# Patient Record
Sex: MALE | Race: BLACK OR AFRICAN AMERICAN | NOT HISPANIC OR LATINO | Employment: OTHER | URBAN - METROPOLITAN AREA
[De-identification: names, ages, dates, MRNs, and addresses within clinical notes are randomized per-mention and may not be internally consistent; named-entity substitution may affect disease eponyms.]

---

## 2022-05-31 ENCOUNTER — HOSPITAL ENCOUNTER (EMERGENCY)
Facility: HOSPITAL | Age: 57
Discharge: HOME | End: 2022-05-31
Attending: EMERGENCY MEDICINE
Payer: MEDICAID

## 2022-05-31 VITALS
RESPIRATION RATE: 16 BRPM | HEART RATE: 85 BPM | WEIGHT: 180 LBS | BODY MASS INDEX: 26.66 KG/M2 | DIASTOLIC BLOOD PRESSURE: 95 MMHG | TEMPERATURE: 99 F | OXYGEN SATURATION: 99 % | HEIGHT: 69 IN | SYSTOLIC BLOOD PRESSURE: 130 MMHG

## 2022-05-31 DIAGNOSIS — M43.6 TORTICOLLIS, ACUTE: Primary | ICD-10-CM

## 2022-05-31 PROCEDURE — 25000003 PHARM REV CODE 250: Performed by: PHYSICIAN ASSISTANT

## 2022-05-31 PROCEDURE — 99284 EMERGENCY DEPT VISIT MOD MDM: CPT

## 2022-05-31 RX ORDER — MELOXICAM 7.5 MG/1
7.5 TABLET ORAL DAILY
Qty: 12 TABLET | Refills: 0 | Status: SHIPPED | OUTPATIENT
Start: 2022-05-31 | End: 2023-04-07 | Stop reason: CLARIF

## 2022-05-31 RX ORDER — ACETAMINOPHEN 500 MG
1000 TABLET ORAL
Status: COMPLETED | OUTPATIENT
Start: 2022-05-31 | End: 2022-05-31

## 2022-05-31 RX ORDER — ORPHENADRINE CITRATE 100 MG/1
100 TABLET, EXTENDED RELEASE ORAL 2 TIMES DAILY
Qty: 8 TABLET | Refills: 0 | Status: SHIPPED | OUTPATIENT
Start: 2022-05-31 | End: 2022-06-04

## 2022-05-31 RX ADMIN — ACETAMINOPHEN 1000 MG: 500 TABLET ORAL at 03:05

## 2022-05-31 NOTE — Clinical Note
"Homil "Homil" Jimy was seen and treated in our emergency department on 5/31/2022.  He may return to work on 06/03/2022.       If you have any questions or concerns, please don't hesitate to call.      Jack Otoole PA-C"
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
## 2022-05-31 NOTE — DISCHARGE INSTRUCTIONS

## 2022-09-15 ENCOUNTER — HOSPITAL ENCOUNTER (EMERGENCY)
Facility: HOSPITAL | Age: 57
Discharge: HOME | End: 2022-09-15
Attending: EMERGENCY MEDICINE
Payer: MEDICAID

## 2022-09-15 VITALS
RESPIRATION RATE: 17 BRPM | TEMPERATURE: 99 F | HEART RATE: 94 BPM | WEIGHT: 180 LBS | BODY MASS INDEX: 26.66 KG/M2 | DIASTOLIC BLOOD PRESSURE: 82 MMHG | SYSTOLIC BLOOD PRESSURE: 135 MMHG | HEIGHT: 69 IN | OXYGEN SATURATION: 100 %

## 2022-09-15 DIAGNOSIS — M10.9 ACUTE GOUT OF LEFT KNEE, UNSPECIFIED CAUSE: Primary | ICD-10-CM

## 2022-09-15 DIAGNOSIS — R52 PAIN: ICD-10-CM

## 2022-09-15 LAB
ALBUMIN SERPL BCP-MCNC: 4.2 G/DL (ref 3.5–5.2)
ALP SERPL-CCNC: 38 U/L (ref 55–135)
ALT SERPL W/O P-5'-P-CCNC: 16 U/L (ref 10–44)
ANION GAP SERPL CALC-SCNC: 13 MMOL/L (ref 8–16)
AST SERPL-CCNC: 27 U/L (ref 10–40)
BASOPHILS # BLD AUTO: 0.04 K/UL (ref 0–0.2)
BASOPHILS NFR BLD: 0.7 % (ref 0–1.9)
BILIRUB SERPL-MCNC: 0.5 MG/DL (ref 0.1–1)
BUN SERPL-MCNC: 30 MG/DL (ref 6–20)
CALCIUM SERPL-MCNC: 10 MG/DL (ref 8.7–10.5)
CHLORIDE SERPL-SCNC: 102 MMOL/L (ref 95–110)
CO2 SERPL-SCNC: 24 MMOL/L (ref 23–29)
CREAT SERPL-MCNC: 1.3 MG/DL (ref 0.5–1.4)
CRP SERPL-MCNC: 6.6 MG/L (ref 0–8.2)
DIFFERENTIAL METHOD: ABNORMAL
EOSINOPHIL # BLD AUTO: 0.1 K/UL (ref 0–0.5)
EOSINOPHIL NFR BLD: 2.6 % (ref 0–8)
ERYTHROCYTE [DISTWIDTH] IN BLOOD BY AUTOMATED COUNT: 13 % (ref 11.5–14.5)
ERYTHROCYTE [SEDIMENTATION RATE] IN BLOOD BY WESTERGREN METHOD: 4 MM/HR (ref 0–10)
EST. GFR  (NO RACE VARIABLE): >60 ML/MIN/1.73 M^2
GLUCOSE SERPL-MCNC: 105 MG/DL (ref 70–110)
HCT VFR BLD AUTO: 39.5 % (ref 40–54)
HGB BLD-MCNC: 13.4 G/DL (ref 14–18)
IMM GRANULOCYTES # BLD AUTO: 0.06 K/UL (ref 0–0.04)
IMM GRANULOCYTES NFR BLD AUTO: 1.1 % (ref 0–0.5)
LYMPHOCYTES # BLD AUTO: 1.4 K/UL (ref 1–4.8)
LYMPHOCYTES NFR BLD: 25.1 % (ref 18–48)
MCH RBC QN AUTO: 32.9 PG (ref 27–31)
MCHC RBC AUTO-ENTMCNC: 33.9 G/DL (ref 32–36)
MCV RBC AUTO: 97 FL (ref 82–98)
MONOCYTES # BLD AUTO: 0.8 K/UL (ref 0.3–1)
MONOCYTES NFR BLD: 13.8 % (ref 4–15)
NEUTROPHILS # BLD AUTO: 3.1 K/UL (ref 1.8–7.7)
NEUTROPHILS NFR BLD: 56.7 % (ref 38–73)
NRBC BLD-RTO: 0 /100 WBC
PLATELET # BLD AUTO: 263 K/UL (ref 150–450)
PMV BLD AUTO: 8.4 FL (ref 9.2–12.9)
POTASSIUM SERPL-SCNC: 4.5 MMOL/L (ref 3.5–5.1)
PROT SERPL-MCNC: 8.1 G/DL (ref 6–8.4)
RBC # BLD AUTO: 4.07 M/UL (ref 4.6–6.2)
SODIUM SERPL-SCNC: 139 MMOL/L (ref 136–145)
URATE SERPL-MCNC: 7.5 MG/DL (ref 3.4–7)
WBC # BLD AUTO: 5.45 K/UL (ref 3.9–12.7)

## 2022-09-15 PROCEDURE — 96374 THER/PROPH/DIAG INJ IV PUSH: CPT

## 2022-09-15 PROCEDURE — 86140 C-REACTIVE PROTEIN: CPT

## 2022-09-15 PROCEDURE — 80053 COMPREHEN METABOLIC PANEL: CPT

## 2022-09-15 PROCEDURE — 85025 COMPLETE CBC W/AUTO DIFF WBC: CPT

## 2022-09-15 PROCEDURE — 63600175 PHARM REV CODE 636 W HCPCS

## 2022-09-15 PROCEDURE — 85652 RBC SED RATE AUTOMATED: CPT

## 2022-09-15 PROCEDURE — 25000003 PHARM REV CODE 250

## 2022-09-15 PROCEDURE — 84550 ASSAY OF BLOOD/URIC ACID: CPT

## 2022-09-15 PROCEDURE — 99284 EMERGENCY DEPT VISIT MOD MDM: CPT | Mod: 25

## 2022-09-15 RX ORDER — KETOROLAC TROMETHAMINE 30 MG/ML
15 INJECTION, SOLUTION INTRAMUSCULAR; INTRAVENOUS
Status: COMPLETED | OUTPATIENT
Start: 2022-09-15 | End: 2022-09-15

## 2022-09-15 RX ORDER — INDOMETHACIN 50 MG/1
50 CAPSULE ORAL
Qty: 15 CAPSULE | Refills: 0 | Status: SHIPPED | OUTPATIENT
Start: 2022-09-15 | End: 2023-04-07 | Stop reason: CLARIF

## 2022-09-15 RX ORDER — COLCHICINE 0.6 MG/1
1.2 TABLET, FILM COATED ORAL
Status: COMPLETED | OUTPATIENT
Start: 2022-09-15 | End: 2022-09-15

## 2022-09-15 RX ORDER — SIMVASTATIN 40 MG/1
40 TABLET, FILM COATED ORAL NIGHTLY
COMMUNITY
Start: 2022-07-28 | End: 2023-04-07 | Stop reason: CLARIF

## 2022-09-15 RX ORDER — COLCHICINE 0.6 MG/1
0.6 TABLET ORAL DAILY
Qty: 1 TABLET | Refills: 0 | Status: SHIPPED | OUTPATIENT
Start: 2022-09-15 | End: 2023-04-07 | Stop reason: CLARIF

## 2022-09-15 RX ORDER — LOSARTAN POTASSIUM AND HYDROCHLOROTHIAZIDE 25; 100 MG/1; MG/1
1 TABLET ORAL DAILY
Status: ON HOLD | COMMUNITY
Start: 2022-07-28 | End: 2023-04-09 | Stop reason: HOSPADM

## 2022-09-15 RX ADMIN — KETOROLAC TROMETHAMINE 15 MG: 30 INJECTION, SOLUTION INTRAMUSCULAR; INTRAVENOUS at 08:09

## 2022-09-15 RX ADMIN — COLCHICINE 1.2 MG: 0.6 TABLET, FILM COATED ORAL at 09:09

## 2022-09-15 NOTE — ED PROVIDER NOTES
Encounter Date: 9/15/2022    SCRIBE #1 NOTE: ICuauhtemoc, am scribing for, and in the presence of,  Alayna Holdsworth, PA-C. Other sections scribed: HPI, ROS.     History     Chief Complaint   Patient presents with    Knee Pain     Pt presents to ED c/o left knee pain and left knee swelling x 2 days.  Pt also reports left knee is tender to touch and warm.  Denies numbness, tingling, falls, injury or any other symptoms.   Pt reports taking Tylenol last night with some relief.  Pain 6/10.      CC: Knee pain    HPI: This is a  56 y.o. M who has HTN who presents to the ED for emergent evaluation of acute and intermittent left knee pain that began 3 days ago. Pt rates the knee pain a 6/10. He describes the knee pain as a burning sensation. The pt's knee pain is exacerbated with walking. He took Tylenol with minimal relief. He does not have a Hx of similar flare up. He reports a Hx of a cyst that was removed from the posterior aspect of the left knee in 2012. He does not have a Hx of Gout. Pt has HTN and High cholesterol, and is compliant with medications. There is no known ill contact. Pt reports no recent travel out of the country. He denies any recent trauma or falls. He has no concerns for STD's. Pt denies fever, chills, diaphoresis, SOB, CP, abdominal pain, nausea, vomiting, diarrhea, constipation, difficulty urinating, dysuria, urinary frequency, urine decreased, penile discharge, penile pain, penile swelling, rash, headaches, numbness, tingling, alcohol use, or recreational drug use.        The history is provided by the patient. No  was used.   Review of patient's allergies indicates:  No Known Allergies  Past Medical History:   Diagnosis Date    Hypertension      Past Surgical History:   Procedure Laterality Date    LEG SURGERY       Family History   Problem Relation Age of Onset    Vision loss Father      Social History     Tobacco Use    Smoking status: Every Day    Smokeless tobacco:  Never   Substance Use Topics    Alcohol use: Yes    Drug use: No     Review of Systems   Constitutional:  Negative for chills, diaphoresis and fever.   HENT:  Negative for sore throat.    Respiratory:  Negative for shortness of breath.    Cardiovascular:  Negative for chest pain.   Gastrointestinal:  Negative for abdominal pain, constipation, diarrhea, nausea and vomiting.   Genitourinary:  Negative for decreased urine volume, difficulty urinating, dysuria, frequency, penile discharge, penile pain, penile swelling, scrotal swelling, testicular pain and urgency.   Musculoskeletal:  Positive for arthralgias (left knee) and joint swelling (left knee). Negative for back pain.   Skin:  Negative for rash and wound.   Neurological:  Negative for weakness, numbness and headaches.        (-) Tingling   Hematological:  Does not bruise/bleed easily.     Physical Exam     Initial Vitals [09/15/22 0653]   BP Pulse Resp Temp SpO2   (!) 140/86 96 18 98.5 °F (36.9 °C) 100 %      MAP       --         Physical Exam    Nursing note and vitals reviewed.  Constitutional: He appears well-developed and well-nourished. He is not diaphoretic. He is active. He does not appear ill. No distress.   HENT:   Head: Normocephalic and atraumatic.   Right Ear: External ear normal.   Left Ear: External ear normal.   Nose: Nose normal.   Eyes: Conjunctivae and lids are normal. Pupils are equal, round, and reactive to light. Right eye exhibits no discharge. Left eye exhibits no discharge. No scleral icterus.   Neck: Neck supple.   Normal range of motion.  Cardiovascular:  Normal rate and regular rhythm.           Pulmonary/Chest: Effort normal and breath sounds normal. No respiratory distress.   Abdominal: He exhibits no distension.   Musculoskeletal:         General: Normal range of motion.      Cervical back: Normal range of motion and neck supple.      Right upper leg: Normal.      Left upper leg: Normal.      Right knee: Normal.      Left knee:  Swelling present. No deformity, effusion, erythema or lacerations. Normal range of motion. Tenderness present over the medial joint line.      Right lower leg: Normal.      Left lower leg: Normal.        Legs:       Comments: TTP of left medial knee with swelling and warmth appreciated. Painful ROM with flexion and extension. Normal sensation.      Neurological: He is alert and oriented to person, place, and time.   Skin: Skin is dry. Capillary refill takes less than 2 seconds.       ED Course   Procedures  Labs Reviewed   CBC W/ AUTO DIFFERENTIAL - Abnormal; Notable for the following components:       Result Value    RBC 4.07 (*)     Hemoglobin 13.4 (*)     Hematocrit 39.5 (*)     MCH 32.9 (*)     MPV 8.4 (*)     Immature Granulocytes 1.1 (*)     Immature Grans (Abs) 0.06 (*)     All other components within normal limits   COMPREHENSIVE METABOLIC PANEL - Abnormal; Notable for the following components:    BUN 30 (*)     Alkaline Phosphatase 38 (*)     All other components within normal limits   URIC ACID - Abnormal; Notable for the following components:    Uric Acid 7.5 (*)     All other components within normal limits   SEDIMENTATION RATE   C-REACTIVE PROTEIN          Imaging Results              X-Ray Knee 3 View Left (Final result)  Result time 09/15/22 07:35:25      Final result by Justin Maguire MD (09/15/22 07:35:25)                   Impression:      As above.      Electronically signed by: Justin Maguire MD  Date:    09/15/2022  Time:    07:35               Narrative:    EXAMINATION:  XR KNEE 3 VIEW LEFT    CLINICAL HISTORY:  Pain, unspecified    TECHNIQUE:  AP, lateral, and Merchant views of the left knee were performed.    FINDINGS:  There is medial compartment joint space loss.  There is no fracture, dislocation, or bony erosion.                                       Medications   ketorolac injection 15 mg (15 mg Intravenous Given 9/15/22 0818)   colchicine capsule/tablet 1.2 mg (1.2 mg Oral Given 9/15/22  2207)     Medical Decision Making:   Initial Assessment:   56 y.o. M who has HTN who presents to the ED for emergent evaluation of acute and intermittent left knee pain.  Patient's chart and medical history reviewed.  Differential Diagnosis:   Fracture  Dislocation  Gout  Pseudogout  Septic arthritis  OA  Clinical Tests:   Lab Tests: Ordered and Reviewed  Radiological Study: Ordered and Reviewed  ED Management:  Patient's vitals reviewed.  He is afebrile, no respiratory distress, nontoxic-appearing in the ED. Patient had TTP of left medial knee with swelling and warmth appreciated. Painful ROM with flexion and extension. Normal sensation.  Patient given Toradol for pain. X-ray showed There is medial compartment joint space loss.  There is no fracture, dislocation, or bony erosion.  CBC showed anemia with an H&H of 13.4 and 39.5 respectively, no need for transfusion at this time. CRP and sed rate were normal range, unlikely septic arthritis.  Uric acid was elevated, likely gout. CMP showed elevated BUN; most likely secondary to the gout.  Patient states he is feeling better.  Patient given colchicine in the ED.  Patient will be sent home with 1 more dose to take an hour later.  Patient be also sent indomethacin for symptomatic control.  Patient will follow-up with his PCP.  Discussed with strict diet precautions to avoid gout flare-ups in the future. Patient agrees with this plan. Discussed with him strict return precautions, he verbalized understanding. Patient is stable for discharge.           Scribe Attestation:   Scribe #1: I performed the above scribed service and the documentation accurately describes the services I performed. I attest to the accuracy of the note.                 I, Alayna Holdsworth,PA-C, personally performed the services described in this documentation. All medical record entries made by the scribe were at my direction and in my presence.  I have reviewed the chart and agree that the record  reflects my personal performance and is accurate and complete.  Clinical Impression:   Final diagnoses:  [R52] Pain  [M10.9] Acute gout of left knee, unspecified cause (Primary)        ED Disposition Condition    Discharge Stable          ED Prescriptions       Medication Sig Dispense Start Date End Date Auth. Provider    colchicine (COLCRYS) 0.6 mg tablet Take 1 tablet (0.6 mg total) by mouth once daily. for 1 day 1 tablet 9/15/2022 9/16/2022 Alayna Holdsworth, PA-C    indomethacin (INDOCIN) 50 MG capsule Take 1 capsule (50 mg total) by mouth 3 (three) times daily with meals. 15 capsule 9/15/2022 -- Alayna Holdsworth, PA-C          Follow-up Information       Follow up With Specialties Details Why Contact Info    [facility] [place]    [address]  [place] [address]  [phone]               Alayna Holdsworth, PA-C  09/15/22 3745

## 2022-09-15 NOTE — ED TRIAGE NOTES
Pt presents to ED c/o left knee pain ( 6/10, burning and achy) and left knee swelling x 3 days.  Pt also reports left knee is tender to touch and warm.    Pt denies numbness, tingling, falls, injury or any other symptoms.     Pt reports taking Tylenol last night with some relief.   Pt reports a surgery behind left knee surgery in 2005.   Pt AAOx4.

## 2022-09-15 NOTE — DISCHARGE INSTRUCTIONS
Thank you for coming to our Emergency Department today. It is important to remember that some problems are difficult to diagnose and may not be found during your first visit. Be sure to follow up with your primary care doctor and review any labs/imaging that was performed with them. If you do not have a primary care doctor, you may contact the one listed on your discharge paperwork or you may also call the [facility] Appointment Desk at [phone] to schedule an appointment with one.     All medications may potentially have side effects and it is impossible to predict which medications may give you side effects. If you feel that you are having a negative effect of any medication you should immediately stop taking them and seek medical attention.    Return to the ER with any questions/concerns, new/concerning symptoms, worsening or failure to improve. Do not drive or make any important decisions for 24 hours if you have received any pain medications, sedatives or mood altering drugs during your ER visit.

## 2023-04-07 ENCOUNTER — HOSPITAL ENCOUNTER (OUTPATIENT)
Facility: HOSPITAL | Age: 58
Discharge: HOME | DRG: 432 | End: 2023-04-09
Attending: INTERNAL MEDICINE | Admitting: STUDENT IN AN ORGANIZED HEALTH CARE EDUCATION/TRAINING PROGRAM
Payer: MEDICAID

## 2023-04-07 DIAGNOSIS — K92.2 UPPER GI BLEED: Primary | ICD-10-CM

## 2023-04-07 DIAGNOSIS — K70.10 ALCOHOLIC HEPATITIS WITHOUT ASCITES: ICD-10-CM

## 2023-04-07 DIAGNOSIS — E16.2 HYPOGLYCEMIA: ICD-10-CM

## 2023-04-07 DIAGNOSIS — F10.930 ALCOHOL WITHDRAWAL SYNDROME WITHOUT COMPLICATION: ICD-10-CM

## 2023-04-07 DIAGNOSIS — R07.9 CHEST PAIN: ICD-10-CM

## 2023-04-07 DIAGNOSIS — R00.0 TACHYCARDIA: ICD-10-CM

## 2023-04-07 DIAGNOSIS — R79.89 ELEVATED TROPONIN: ICD-10-CM

## 2023-04-07 DIAGNOSIS — K92.0 HEMATEMESIS WITH NAUSEA: ICD-10-CM

## 2023-04-07 DIAGNOSIS — F10.939 ALCOHOL WITHDRAWAL: ICD-10-CM

## 2023-04-07 DIAGNOSIS — D72.829 LEUKOCYTOSIS, UNSPECIFIED TYPE: ICD-10-CM

## 2023-04-07 LAB
ABO + RH BLD: NORMAL
ALBUMIN SERPL BCP-MCNC: 4 G/DL (ref 3.5–5.2)
ALP SERPL-CCNC: 49 U/L (ref 55–135)
ALT SERPL W/O P-5'-P-CCNC: 97 U/L (ref 10–44)
AMPHET+METHAMPHET UR QL: NEGATIVE
ANION GAP SERPL CALC-SCNC: 17 MMOL/L (ref 8–16)
ANION GAP SERPL CALC-SCNC: 35 MMOL/L (ref 8–16)
AST SERPL-CCNC: 163 U/L (ref 10–40)
BARBITURATES UR QL SCN>200 NG/ML: NEGATIVE
BASOPHILS # BLD AUTO: 0.06 K/UL (ref 0–0.2)
BASOPHILS NFR BLD: 0.4 % (ref 0–1.9)
BENZODIAZ UR QL SCN>200 NG/ML: NEGATIVE
BILIRUB SERPL-MCNC: 0.4 MG/DL (ref 0.1–1)
BILIRUB UR QL STRIP: NEGATIVE
BLD GP AB SCN CELLS X3 SERPL QL: NORMAL
BNP SERPL-MCNC: 36 PG/ML (ref 0–99)
BUN SERPL-MCNC: 23 MG/DL (ref 6–20)
BUN SERPL-MCNC: 24 MG/DL (ref 6–20)
BZE UR QL SCN: NEGATIVE
CALCIUM SERPL-MCNC: 8.7 MG/DL (ref 8.7–10.5)
CALCIUM SERPL-MCNC: 9.2 MG/DL (ref 8.7–10.5)
CANNABINOIDS UR QL SCN: NEGATIVE
CHLORIDE SERPL-SCNC: 102 MMOL/L (ref 95–110)
CHLORIDE SERPL-SCNC: 105 MMOL/L (ref 95–110)
CLARITY UR: CLEAR
CO2 SERPL-SCNC: 19 MMOL/L (ref 23–29)
CO2 SERPL-SCNC: 8 MMOL/L (ref 23–29)
COLOR UR: COLORLESS
CREAT SERPL-MCNC: 1.3 MG/DL (ref 0.5–1.4)
CREAT SERPL-MCNC: 1.4 MG/DL (ref 0.5–1.4)
CREAT UR-MCNC: 53.9 MG/DL (ref 23–375)
CTP QC/QA: YES
CTP QC/QA: YES
D DIMER PPP IA.FEU-MCNC: >33 MG/L FEU
DIFFERENTIAL METHOD: ABNORMAL
EOSINOPHIL # BLD AUTO: 0 K/UL (ref 0–0.5)
EOSINOPHIL NFR BLD: 0 % (ref 0–8)
ERYTHROCYTE [DISTWIDTH] IN BLOOD BY AUTOMATED COUNT: 17.6 % (ref 11.5–14.5)
EST. GFR  (NO RACE VARIABLE): 58 ML/MIN/1.73 M^2
EST. GFR  (NO RACE VARIABLE): >60 ML/MIN/1.73 M^2
ETHANOL SERPL-MCNC: <10 MG/DL
GLUCOSE SERPL-MCNC: 100 MG/DL (ref 70–110)
GLUCOSE SERPL-MCNC: 136 MG/DL (ref 70–110)
GLUCOSE UR QL STRIP: NEGATIVE
HCT VFR BLD AUTO: 42.4 % (ref 40–54)
HGB BLD-MCNC: 13.4 G/DL (ref 14–18)
HGB UR QL STRIP: ABNORMAL
IMM GRANULOCYTES # BLD AUTO: 0.1 K/UL (ref 0–0.04)
IMM GRANULOCYTES NFR BLD AUTO: 0.6 % (ref 0–0.5)
INR PPP: 1.3 (ref 0.8–1.2)
KETONES UR QL STRIP: ABNORMAL
LEUKOCYTE ESTERASE UR QL STRIP: NEGATIVE
LYMPHOCYTES # BLD AUTO: 0.8 K/UL (ref 1–4.8)
LYMPHOCYTES NFR BLD: 5.5 % (ref 18–48)
MAGNESIUM SERPL-MCNC: 1.4 MG/DL (ref 1.6–2.6)
MCH RBC QN AUTO: 32.8 PG (ref 27–31)
MCHC RBC AUTO-ENTMCNC: 31.6 G/DL (ref 32–36)
MCV RBC AUTO: 104 FL (ref 82–98)
METHADONE UR QL SCN>300 NG/ML: NEGATIVE
MICROSCOPIC COMMENT: NORMAL
MONOCYTES # BLD AUTO: 1.9 K/UL (ref 0.3–1)
MONOCYTES NFR BLD: 12.1 % (ref 4–15)
NEUTROPHILS # BLD AUTO: 12.6 K/UL (ref 1.8–7.7)
NEUTROPHILS NFR BLD: 81.4 % (ref 38–73)
NITRITE UR QL STRIP: NEGATIVE
NRBC BLD-RTO: 0 /100 WBC
OPIATES UR QL SCN: NEGATIVE
PCP UR QL SCN>25 NG/ML: NEGATIVE
PH UR STRIP: 5 [PH] (ref 5–8)
PLATELET # BLD AUTO: 236 K/UL (ref 150–450)
PMV BLD AUTO: 11.9 FL (ref 9.2–12.9)
POC MOLECULAR INFLUENZA A AGN: NEGATIVE
POC MOLECULAR INFLUENZA B AGN: NEGATIVE
POCT GLUCOSE: 106 MG/DL (ref 70–110)
POCT GLUCOSE: 210 MG/DL (ref 70–110)
POCT GLUCOSE: 96 MG/DL (ref 70–110)
POTASSIUM SERPL-SCNC: 5.2 MMOL/L (ref 3.5–5.1)
POTASSIUM SERPL-SCNC: 5.8 MMOL/L (ref 3.5–5.1)
PROT SERPL-MCNC: 7.8 G/DL (ref 6–8.4)
PROT UR QL STRIP: ABNORMAL
PROTHROMBIN TIME: 13.3 SEC (ref 9–12.5)
RBC # BLD AUTO: 4.09 M/UL (ref 4.6–6.2)
RBC #/AREA URNS HPF: 0 /HPF (ref 0–4)
SARS-COV-2 RDRP RESP QL NAA+PROBE: NEGATIVE
SODIUM SERPL-SCNC: 141 MMOL/L (ref 136–145)
SODIUM SERPL-SCNC: 145 MMOL/L (ref 136–145)
SP GR UR STRIP: 1.01 (ref 1–1.03)
SPECIMEN OUTDATE: NORMAL
TOXICOLOGY INFORMATION: NORMAL
TROPONIN I SERPL DL<=0.01 NG/ML-MCNC: 0.02 NG/ML (ref 0–0.03)
TROPONIN I SERPL DL<=0.01 NG/ML-MCNC: 0.03 NG/ML (ref 0–0.03)
TROPONIN I SERPL DL<=0.01 NG/ML-MCNC: 0.03 NG/ML (ref 0–0.03)
URN SPEC COLLECT METH UR: ABNORMAL
UROBILINOGEN UR STRIP-ACNC: NEGATIVE EU/DL
WBC # BLD AUTO: 15.41 K/UL (ref 3.9–12.7)

## 2023-04-07 PROCEDURE — 96372 THER/PROPH/DIAG INJ SC/IM: CPT | Mod: 59 | Performed by: INTERNAL MEDICINE

## 2023-04-07 PROCEDURE — 96366 THER/PROPH/DIAG IV INF ADDON: CPT

## 2023-04-07 PROCEDURE — 63600175 PHARM REV CODE 636 W HCPCS: Performed by: EMERGENCY MEDICINE

## 2023-04-07 PROCEDURE — 86900 BLOOD TYPING SEROLOGIC ABO: CPT | Performed by: EMERGENCY MEDICINE

## 2023-04-07 PROCEDURE — 25000003 PHARM REV CODE 250: Performed by: STUDENT IN AN ORGANIZED HEALTH CARE EDUCATION/TRAINING PROGRAM

## 2023-04-07 PROCEDURE — 96375 TX/PRO/DX INJ NEW DRUG ADDON: CPT | Mod: 59

## 2023-04-07 PROCEDURE — C9113 INJ PANTOPRAZOLE SODIUM, VIA: HCPCS | Performed by: INTERNAL MEDICINE

## 2023-04-07 PROCEDURE — 83735 ASSAY OF MAGNESIUM: CPT | Performed by: EMERGENCY MEDICINE

## 2023-04-07 PROCEDURE — 25000003 PHARM REV CODE 250: Performed by: INTERNAL MEDICINE

## 2023-04-07 PROCEDURE — 80053 COMPREHEN METABOLIC PANEL: CPT | Performed by: INTERNAL MEDICINE

## 2023-04-07 PROCEDURE — 80307 DRUG TEST PRSMV CHEM ANLYZR: CPT | Performed by: EMERGENCY MEDICINE

## 2023-04-07 PROCEDURE — G0378 HOSPITAL OBSERVATION PER HR: HCPCS

## 2023-04-07 PROCEDURE — 84484 ASSAY OF TROPONIN QUANT: CPT | Mod: 91 | Performed by: EMERGENCY MEDICINE

## 2023-04-07 PROCEDURE — 93005 ELECTROCARDIOGRAM TRACING: CPT

## 2023-04-07 PROCEDURE — 87502 INFLUENZA DNA AMP PROBE: CPT

## 2023-04-07 PROCEDURE — 86900 BLOOD TYPING SEROLOGIC ABO: CPT | Performed by: STUDENT IN AN ORGANIZED HEALTH CARE EDUCATION/TRAINING PROGRAM

## 2023-04-07 PROCEDURE — 82962 GLUCOSE BLOOD TEST: CPT | Mod: 59

## 2023-04-07 PROCEDURE — 25000003 PHARM REV CODE 250: Performed by: EMERGENCY MEDICINE

## 2023-04-07 PROCEDURE — 99223 PR INITIAL HOSPITAL CARE,LEVL III: ICD-10-PCS | Mod: ,,, | Performed by: INTERNAL MEDICINE

## 2023-04-07 PROCEDURE — 83880 ASSAY OF NATRIURETIC PEPTIDE: CPT | Performed by: INTERNAL MEDICINE

## 2023-04-07 PROCEDURE — 84484 ASSAY OF TROPONIN QUANT: CPT | Mod: 91 | Performed by: STUDENT IN AN ORGANIZED HEALTH CARE EDUCATION/TRAINING PROGRAM

## 2023-04-07 PROCEDURE — 85379 FIBRIN DEGRADATION QUANT: CPT | Performed by: EMERGENCY MEDICINE

## 2023-04-07 PROCEDURE — 82077 ASSAY SPEC XCP UR&BREATH IA: CPT | Performed by: EMERGENCY MEDICINE

## 2023-04-07 PROCEDURE — 63600175 PHARM REV CODE 636 W HCPCS: Mod: JA | Performed by: INTERNAL MEDICINE

## 2023-04-07 PROCEDURE — 80048 BASIC METABOLIC PNL TOTAL CA: CPT | Mod: XB | Performed by: EMERGENCY MEDICINE

## 2023-04-07 PROCEDURE — 99285 EMERGENCY DEPT VISIT HI MDM: CPT | Mod: 25

## 2023-04-07 PROCEDURE — 84484 ASSAY OF TROPONIN QUANT: CPT | Performed by: INTERNAL MEDICINE

## 2023-04-07 PROCEDURE — 99223 1ST HOSP IP/OBS HIGH 75: CPT | Mod: ,,, | Performed by: INTERNAL MEDICINE

## 2023-04-07 PROCEDURE — 63600175 PHARM REV CODE 636 W HCPCS: Performed by: HOSPITALIST

## 2023-04-07 PROCEDURE — 11000001 HC ACUTE MED/SURG PRIVATE ROOM

## 2023-04-07 PROCEDURE — 96361 HYDRATE IV INFUSION ADD-ON: CPT

## 2023-04-07 PROCEDURE — 96368 THER/DIAG CONCURRENT INF: CPT

## 2023-04-07 PROCEDURE — 25500020 PHARM REV CODE 255: Performed by: EMERGENCY MEDICINE

## 2023-04-07 PROCEDURE — 96365 THER/PROPH/DIAG IV INF INIT: CPT

## 2023-04-07 PROCEDURE — 81000 URINALYSIS NONAUTO W/SCOPE: CPT | Mod: 59 | Performed by: EMERGENCY MEDICINE

## 2023-04-07 PROCEDURE — 93010 ELECTROCARDIOGRAM REPORT: CPT | Mod: ,,, | Performed by: INTERNAL MEDICINE

## 2023-04-07 PROCEDURE — 85610 PROTHROMBIN TIME: CPT | Performed by: STUDENT IN AN ORGANIZED HEALTH CARE EDUCATION/TRAINING PROGRAM

## 2023-04-07 PROCEDURE — 63600175 PHARM REV CODE 636 W HCPCS: Mod: JZ,JG | Performed by: INTERNAL MEDICINE

## 2023-04-07 PROCEDURE — 93010 EKG 12-LEAD: ICD-10-PCS | Mod: ,,, | Performed by: INTERNAL MEDICINE

## 2023-04-07 PROCEDURE — 36415 COLL VENOUS BLD VENIPUNCTURE: CPT | Performed by: STUDENT IN AN ORGANIZED HEALTH CARE EDUCATION/TRAINING PROGRAM

## 2023-04-07 PROCEDURE — 85025 COMPLETE CBC W/AUTO DIFF WBC: CPT | Performed by: INTERNAL MEDICINE

## 2023-04-07 RX ORDER — ONDANSETRON 8 MG/1
8 TABLET, ORALLY DISINTEGRATING ORAL EVERY 8 HOURS PRN
Status: DISCONTINUED | OUTPATIENT
Start: 2023-04-07 | End: 2023-04-09 | Stop reason: HOSPADM

## 2023-04-07 RX ORDER — FOLIC ACID 1 MG/1
1 TABLET ORAL DAILY
Status: DISCONTINUED | OUTPATIENT
Start: 2023-04-07 | End: 2023-04-07

## 2023-04-07 RX ORDER — ATORVASTATIN CALCIUM 20 MG/1
20 TABLET, FILM COATED ORAL NIGHTLY
Status: ON HOLD | COMMUNITY
Start: 2023-02-07 | End: 2023-04-09 | Stop reason: HOSPADM

## 2023-04-07 RX ORDER — DEXTROSE 40 %
30 GEL (GRAM) ORAL
Status: DISCONTINUED | OUTPATIENT
Start: 2023-04-07 | End: 2023-04-09 | Stop reason: HOSPADM

## 2023-04-07 RX ORDER — ACETAMINOPHEN 500 MG
1000 TABLET ORAL
Status: COMPLETED | OUTPATIENT
Start: 2023-04-07 | End: 2023-04-07

## 2023-04-07 RX ORDER — LANOLIN ALCOHOL/MO/W.PET/CERES
100 CREAM (GRAM) TOPICAL DAILY
Status: DISCONTINUED | OUTPATIENT
Start: 2023-04-07 | End: 2023-04-07

## 2023-04-07 RX ORDER — PANTOPRAZOLE SODIUM 40 MG/10ML
40 INJECTION, POWDER, LYOPHILIZED, FOR SOLUTION INTRAVENOUS
Status: COMPLETED | OUTPATIENT
Start: 2023-04-07 | End: 2023-04-07

## 2023-04-07 RX ORDER — SODIUM CHLORIDE 0.9 % (FLUSH) 0.9 %
10 SYRINGE (ML) INJECTION EVERY 12 HOURS PRN
Status: DISCONTINUED | OUTPATIENT
Start: 2023-04-07 | End: 2023-04-09 | Stop reason: HOSPADM

## 2023-04-07 RX ORDER — LANOLIN ALCOHOL/MO/W.PET/CERES
100 CREAM (GRAM) TOPICAL DAILY
Status: DISCONTINUED | OUTPATIENT
Start: 2023-04-08 | End: 2023-04-09 | Stop reason: HOSPADM

## 2023-04-07 RX ORDER — ACETAMINOPHEN 325 MG/1
650 TABLET ORAL EVERY 4 HOURS PRN
Status: DISCONTINUED | OUTPATIENT
Start: 2023-04-07 | End: 2023-04-09 | Stop reason: HOSPADM

## 2023-04-07 RX ORDER — LORAZEPAM 2 MG/ML
1 INJECTION INTRAMUSCULAR
Status: COMPLETED | OUTPATIENT
Start: 2023-04-07 | End: 2023-04-07

## 2023-04-07 RX ORDER — GLUCAGON 1 MG
1 KIT INJECTION
Status: DISCONTINUED | OUTPATIENT
Start: 2023-04-07 | End: 2023-04-09 | Stop reason: HOSPADM

## 2023-04-07 RX ORDER — FOLIC ACID 1 MG/1
1 TABLET ORAL DAILY
Status: DISCONTINUED | OUTPATIENT
Start: 2023-04-08 | End: 2023-04-09 | Stop reason: HOSPADM

## 2023-04-07 RX ORDER — DIAZEPAM 5 MG/1
5 TABLET ORAL EVERY 8 HOURS
Status: DISCONTINUED | OUTPATIENT
Start: 2023-04-07 | End: 2023-04-09

## 2023-04-07 RX ORDER — FOLIC ACID 1 MG/1
1 TABLET ORAL
Status: COMPLETED | OUTPATIENT
Start: 2023-04-07 | End: 2023-04-07

## 2023-04-07 RX ORDER — DIPHENHYDRAMINE HYDROCHLORIDE 50 MG/ML
25 INJECTION INTRAMUSCULAR; INTRAVENOUS ONCE
Status: COMPLETED | OUTPATIENT
Start: 2023-04-07 | End: 2023-04-07

## 2023-04-07 RX ORDER — DEXTROSE 40 %
15 GEL (GRAM) ORAL
Status: DISCONTINUED | OUTPATIENT
Start: 2023-04-07 | End: 2023-04-09 | Stop reason: HOSPADM

## 2023-04-07 RX ORDER — NALOXONE HCL 0.4 MG/ML
0.02 VIAL (ML) INJECTION
Status: DISCONTINUED | OUTPATIENT
Start: 2023-04-07 | End: 2023-04-09 | Stop reason: HOSPADM

## 2023-04-07 RX ORDER — GLUCAGON 1 MG
1 KIT INJECTION
Status: COMPLETED | OUTPATIENT
Start: 2023-04-07 | End: 2023-04-07

## 2023-04-07 RX ORDER — PROCHLORPERAZINE EDISYLATE 5 MG/ML
5 INJECTION INTRAMUSCULAR; INTRAVENOUS ONCE
Status: COMPLETED | OUTPATIENT
Start: 2023-04-07 | End: 2023-04-07

## 2023-04-07 RX ORDER — AMLODIPINE BESYLATE 10 MG/1
10 TABLET ORAL DAILY
Status: ON HOLD | COMMUNITY
End: 2023-04-09 | Stop reason: HOSPADM

## 2023-04-07 RX ORDER — LORAZEPAM 0.5 MG/1
2 TABLET ORAL EVERY 4 HOURS PRN
Status: DISCONTINUED | OUTPATIENT
Start: 2023-04-07 | End: 2023-04-09 | Stop reason: HOSPADM

## 2023-04-07 RX ORDER — LANOLIN ALCOHOL/MO/W.PET/CERES
100 CREAM (GRAM) TOPICAL
Status: COMPLETED | OUTPATIENT
Start: 2023-04-07 | End: 2023-04-07

## 2023-04-07 RX ORDER — DIAZEPAM 5 MG/1
5 TABLET ORAL
Status: COMPLETED | OUTPATIENT
Start: 2023-04-07 | End: 2023-04-07

## 2023-04-07 RX ADMIN — OCTREOTIDE ACETATE 50 MCG/HR: 500 INJECTION, SOLUTION INTRAVENOUS; SUBCUTANEOUS at 09:04

## 2023-04-07 RX ADMIN — DIAZEPAM 5 MG: 5 TABLET ORAL at 03:04

## 2023-04-07 RX ADMIN — PANTOPRAZOLE SODIUM 8 MG/HR: 40 INJECTION, POWDER, FOR SOLUTION INTRAVENOUS at 10:04

## 2023-04-07 RX ADMIN — GLUCAGON 1 MG: 1 INJECTION, POWDER, LYOPHILIZED, FOR SOLUTION INTRAMUSCULAR; INTRAVENOUS at 04:04

## 2023-04-07 RX ADMIN — THIAMINE HCL TAB 100 MG 100 MG: 100 TAB at 03:04

## 2023-04-07 RX ADMIN — ONDANSETRON 8 MG: 8 TABLET, ORALLY DISINTEGRATING ORAL at 05:04

## 2023-04-07 RX ADMIN — DIPHENHYDRAMINE HYDROCHLORIDE 25 MG: 50 INJECTION, SOLUTION INTRAMUSCULAR; INTRAVENOUS at 08:04

## 2023-04-07 RX ADMIN — LORAZEPAM 1 MG: 2 INJECTION INTRAMUSCULAR; INTRAVENOUS at 12:04

## 2023-04-07 RX ADMIN — IOHEXOL 100 ML: 350 INJECTION, SOLUTION INTRAVENOUS at 12:04

## 2023-04-07 RX ADMIN — OCTREOTIDE ACETATE 50 MCG/HR: 500 INJECTION, SOLUTION INTRAVENOUS; SUBCUTANEOUS at 08:04

## 2023-04-07 RX ADMIN — PANTOPRAZOLE SODIUM 8 MG/HR: 40 INJECTION, POWDER, FOR SOLUTION INTRAVENOUS at 08:04

## 2023-04-07 RX ADMIN — FOLIC ACID 1 MG: 1 TABLET ORAL at 03:04

## 2023-04-07 RX ADMIN — PANTOPRAZOLE SODIUM 40 MG: 40 INJECTION, POWDER, FOR SOLUTION INTRAVENOUS at 06:04

## 2023-04-07 RX ADMIN — ACETAMINOPHEN 1000 MG: 500 TABLET ORAL at 06:04

## 2023-04-07 RX ADMIN — SODIUM CHLORIDE 2000 ML: 9 INJECTION, SOLUTION INTRAVENOUS at 05:04

## 2023-04-07 RX ADMIN — PANTOPRAZOLE SODIUM 8 MG/HR: 40 INJECTION, POWDER, FOR SOLUTION INTRAVENOUS at 05:04

## 2023-04-07 RX ADMIN — PROCHLORPERAZINE EDISYLATE 5 MG: 5 INJECTION INTRAMUSCULAR; INTRAVENOUS at 08:04

## 2023-04-07 NOTE — H&P
[facility] [place] Emergency Dept  [facility] Medicine  History & Physical    Patient Name: Idalia Ahn  MRN: [id number]  Patient Class: IP- Inpatient  Admission Date: 4/7/2023  Attending Physician: Jj Kaur MD   Primary Care Provider: Provider [employer]         Patient information was obtained from patient, past medical records and ER records.     Subjective:     Principal Problem:Alcoholic hepatitis without ascites    Chief Complaint:   Chief Complaint   Patient presents with    Hypoglycemia     Pt arrived via ems, pt chief complaint is Hypoglycemia. Pt initially called for flu like symptoms not feeling well. Pt was then found to have CBG of 38, pt received d10 bolus and last check was 68.         HPI: Mr. Ahn is a 58 yo M with hx of HLD and HTN who presents to the ED for evaluation of fatigue, weakness and sore throat. He states he drank 1-2 bottles/shots of gin yesterday and started to develop symptoms. Has had a good appetite but yesterday did not eat much.  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  He did not feel well who presented to the ER last night where he then proceeded to vomit.  He tells me this was mainly water with streaks of blood in it.   In the ED, he was found to have an elevated WBC, elevated d-dimer, metabolic acidosis. There was also concerns for GIB per ED. He was started on IV PPI and octreotide in ED. CTA with no definite PE. Admitted to hospital medicine for further management.      Past Medical History:   Diagnosis Date    Alcohol abuse, daily use     Hypertension        Past Surgical History:   Procedure Laterality Date    LEG SURGERY Left     POSTERIOR KNEE CYST REMOVAL       Review of patient's allergies indicates:  No Known Allergies    No current facility-administered medications on file prior to encounter.     Current Outpatient Medications on File Prior to Encounter   Medication Sig    amlodipine (NORVASC) 10 MG tablet Take 1 tablet (10 mg total) by  mouth once daily.    colchicine (COLCRYS) 0.6 mg tablet Take 1 tablet (0.6 mg total) by mouth once daily. for 1 day    indomethacin (INDOCIN) 50 MG capsule Take 1 capsule (50 mg total) by mouth 3 (three) times daily with meals.    losartan-hydrochlorothiazide 100-25 mg (HYZAAR) 100-25 mg per tablet Take 1 tablet by mouth once daily.    meloxicam (MOBIC) 7.5 MG tablet Take 1 tablet (7.5 mg total) by mouth once daily.    simvastatin (ZOCOR) 40 MG tablet Take 40 mg by mouth every evening.     Family History       Problem Relation (Age of Onset)    Vision loss Father          Tobacco Use    Smoking status: Some Days     Types: Cigarettes    Smokeless tobacco: Never   Substance and Sexual Activity    Alcohol use: Yes     Comment: DAILY LIQUOR/GIN USE    Drug use: No    Sexual activity: Not on file     Review of Systems  Objective:     Vital Signs (Most Recent):  Temp: 99 °F (37.2 °C) (04/07/23 1149)  Pulse: 90 (04/07/23 1607)  Resp: (!) 29 (04/07/23 1511)  BP: 135/83 (04/07/23 1602)  SpO2: 97 % (04/07/23 1607)   Vital Signs (24h Range):  Temp:  [98.5 °F (36.9 °C)-99 °F (37.2 °C)] 99 °F (37.2 °C)  Pulse:  [] 90  Resp:  [11-29] 29  SpO2:  [95 %-100 %] 97 %  BP: (110-151)/(60-90) 135/83     Weight: 81.6 kg (180 lb)  Body mass index is 26.58 kg/m².    Physical Exam  Vitals and nursing note reviewed.   Constitutional:       General: He is not in acute distress.     Appearance: Normal appearance. He is ill-appearing.   HENT:      Mouth/Throat:      Pharynx: Oropharynx is clear.   Cardiovascular:      Rate and Rhythm: Regular rhythm. Tachycardia present.      Pulses: Normal pulses.   Pulmonary:      Effort: Pulmonary effort is normal. No respiratory distress.      Breath sounds: No wheezing.   Abdominal:      General: Bowel sounds are normal. There is no distension.      Palpations: Abdomen is soft.   Musculoskeletal:         General: No swelling.   Skin:     General: Skin is warm and dry.   Neurological:       General: No focal deficit present.      Mental Status: He is alert and oriented to person, place, and time.   Psychiatric:         Mood and Affect: Mood normal.      Comments: jittery           Significant Labs: All pertinent labs within the past 24 hours have been reviewed.    Significant Imaging: I have reviewed all pertinent imaging results/findings within the past 24 hours.    Assessment/Plan:     * Alcoholic hepatitis without ascites  LFTs elevated AST//97 with hx of alcohol use disorder  MDF 15.6 so no indication for steroids at this time  -  monitor      Elevated troponin  Very mildly elevated but did increase to 0.032 from normal, no chest pain and ECG reviewed.  - likely demand but will plan to trend out  - keep on telemetry  - do not suspect ACS       Leukocytosis  No evidence of infection, suspect reactive  - monitor for now      Upper GI bleed  Unclear if true upper GI bleed, patient states he had some minimally streaks of blood and his Hb is at his baseline  - will continue with octreotide and PPI for now and follow up GI recommendations      Alcohol use  Heavy alcohol use with last drink being yesterday.   - Start on valium taper  CIWA protocol       High anion gap metabolic acidosis  Suspect due to alcohol abuse and starvation ketosis. Given fluids and improved to 17.  - continue with IVF and supportive care  - hypoglycemic on admit      Hypoglycemia  Now resolved with dextrose  - suspect due to alcohol use/poor PO intake  - glucose checks    VTE Risk Mitigation (From admission, onward)         Ordered     IP VTE LOW RISK PATIENT  Once         04/07/23 1455     Place sequential compression device  Until discontinued         04/07/23 1455                           Jj Kaur MD  Department of Hospital Medicine  [facility] - Emergency Dept

## 2023-04-07 NOTE — ASSESSMENT & PLAN NOTE
Suspect due to alcohol abuse and starvation ketosis. Given fluids and improved to 17.  - continue with IVF and supportive care  - hypoglycemic on admit

## 2023-04-07 NOTE — NURSING
[facility]  Nurses Note -- 4 Eyes      4/7/2023       Skin assessed on: Admit      [x] No Pressure Injuries Present    []Prevention Measures Documented    [] Yes LDA  for Pressure Injury Previously documented     [] Yes New Pressure Injury Discovered   [] LDA for New Pressure Injury Added      Attending RN:  Olga Lidia Mcintosh RN     Second RN:  Jessica Bliss

## 2023-04-07 NOTE — HPI
Mr. Ahn is a 56 yo M with hx of HLD and HTN who presents to the ED for evaluation of fatigue, weakness and sore throat. He states he drank 1-2 bottles/shots of gin yesterday and started to develop symptoms. Has had a good appetite but yesterday did not eat much.  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  He did not feel well who presented to the ER last night where he then proceeded to vomit.  He tells me this was mainly water with streaks of blood in it.   In the ED, he was found to have an elevated WBC, elevated d-dimer, metabolic acidosis. There was also concerns for GIB per ED. He was started on IV PPI and octreotide in ED. CTA with no definite PE. Admitted to hospital medicine for further management.

## 2023-04-07 NOTE — ED PROVIDER NOTES
Encounter Date: 4/7/2023       History     Chief Complaint   Patient presents with    Hypoglycemia     Pt arrived via ems, pt chief complaint is Hypoglycemia. Pt initially called for flu like symptoms not feeling well. Pt was then found to have CBG of 38, pt received d10 bolus and last check was 68.      57-year-old male with a past medical history significant for alcoholism and hypoglycemia presents to the emergency department via EMS with chief complaint of hypoglycemia.  EMS was initially called secondary to patient complaining of flu-like symptoms, but upon arrival glucose was 38.  Patient received D10 EN route and repeat glucose was 68.  Patient states he drank a small amount of alcohol last night and denies chest pain/fever/chills/nausea/vomiting/shortness of breath at this time.      The history is provided by the patient. No  was used.   Review of patient's allergies indicates:  No Known Allergies  Past Medical History:   Diagnosis Date    Hypertension      Past Surgical History:   Procedure Laterality Date    LEG SURGERY       Family History   Problem Relation Age of Onset    Vision loss Father      Social History     Tobacco Use    Smoking status: Every Day    Smokeless tobacco: Never   Substance Use Topics    Alcohol use: Yes    Drug use: No     Review of Systems   Constitutional:  Positive for fatigue. Negative for chills and fever.   Respiratory:  Negative for shortness of breath.    Cardiovascular:  Negative for chest pain.   Gastrointestinal:  Negative for diarrhea and vomiting.   All other systems reviewed and are negative.    Physical Exam     Initial Vitals [04/07/23 0426]   BP Pulse Resp Temp SpO2   110/60 (!) 118 16 98.5 °F (36.9 °C) 100 %      MAP       --         Physical Exam    Nursing note and vitals reviewed.  Constitutional: He is not diaphoretic. No distress.   HENT:   Head: Normocephalic and atraumatic.   Right Ear: External ear normal.   Left Ear: External ear  normal.   Eyes: Conjunctivae are normal.   Neck: Neck supple.   Normal range of motion.  Cardiovascular:  Normal rate, regular rhythm and intact distal pulses.           Pulmonary/Chest: Breath sounds normal. No respiratory distress.   Abdominal: Abdomen is soft. Bowel sounds are normal.   Musculoskeletal:         General: Normal range of motion.      Cervical back: Normal range of motion and neck supple.     Neurological: He is alert and oriented to person, place, and time. He has normal strength.   Skin: Skin is warm and dry. Capillary refill takes less than 2 seconds.   Psychiatric: He has a normal mood and affect. Thought content normal.       ED Course   Procedures  Labs Reviewed   CBC W/ AUTO DIFFERENTIAL - Abnormal; Notable for the following components:       Result Value    WBC 15.41 (*)     RBC 4.09 (*)     Hemoglobin 13.4 (*)      (*)     MCH 32.8 (*)     MCHC 31.6 (*)     RDW 17.6 (*)     Immature Granulocytes 0.6 (*)     Gran # (ANC) 12.6 (*)     Immature Grans (Abs) 0.10 (*)     Lymph # 0.8 (*)     Mono # 1.9 (*)     Gran % 81.4 (*)     Lymph % 5.5 (*)     All other components within normal limits   COMPREHENSIVE METABOLIC PANEL - Abnormal; Notable for the following components:    Potassium 5.8 (*)     CO2 8 (*)     BUN 24 (*)     Alkaline Phosphatase 49 (*)      (*)     ALT 97 (*)     Anion Gap 35 (*)     All other components within normal limits    Narrative:     CO2  critical result(s) called and verbal readback obtained from   Lainey Alvarado RN  by LN2 04/07/2023 05:39   TROPONIN I - Abnormal; Notable for the following components:    Troponin I 0.032 (*)     All other components within normal limits   URINALYSIS, REFLEX TO URINE CULTURE - Abnormal; Notable for the following components:    Color, UA Colorless (*)     Protein, UA Trace (*)     Ketones, UA 2+ (*)     Occult Blood UA 1+ (*)     All other components within normal limits    Narrative:     Specimen Source->Urine   BASIC  METABOLIC PANEL - Abnormal; Notable for the following components:    Potassium 5.2 (*)     CO2 19 (*)     Glucose 136 (*)     BUN 23 (*)     Anion Gap 17 (*)     eGFR 58 (*)     All other components within normal limits   D DIMER, QUANTITATIVE - Abnormal; Notable for the following components:    D-Dimer >33.00 (*)     All other components within normal limits   MAGNESIUM - Abnormal; Notable for the following components:    Magnesium 1.4 (*)     All other components within normal limits   B-TYPE NATRIURETIC PEPTIDE   TROPONIN I   URINALYSIS MICROSCOPIC    Narrative:     Specimen Source->Urine   ALCOHOL,MEDICAL (ETHANOL)   DRUG SCREEN PANEL, URINE EMERGENCY    Narrative:     Specimen Source->Urine   MAGNESIUM   SARS-COV-2 RDRP GENE   POCT INFLUENZA A/B MOLECULAR   TYPE & SCREEN   POCT GLUCOSE   POCT GLUCOSE          Imaging Results              CTA Chest Non-Coronary (PE Studies) (Final result)  Result time 04/07/23 13:11:33      Final result by Shon Jones MD (04/07/23 13:11:33)                   Impression:      1. Technically limited study as above.  No acute cardiopulmonary process.  Specifically, no central PE or evidence of pulmonary thromboembolism through the proximal segmental levels.  Cannot exclude distal segmental or subsegmental pulmonary thromboemboli.  Further evaluation/follow-up as warranted.  2. Urinary bladder mild circumferential wall thickening with subtle perivesicular stranding which may be related to nonspecific cystitis.  Clinical correlation and with urinalysis advised.  3. Mild colonic diverticulosis without diverticulitis.  4. Hepatomegaly and suspected hepatic steatosis.  5. Minimal systemic atherosclerosis.  No evidence of aortic aneurysm or dissection.  6. Right lung apex 3 mm solid pulmonary nodule.  For a solid nodule <6 mm, Fleischner Society 2017 guidelines recommend no routine follow up for a low risk patient, or follow-up with non-contrast chest CT at 12 months in a high risk  patient.  7. Few additional findings as above.      Electronically signed by: Shon Jones MD  Date:    04/07/2023  Time:    13:11               Narrative:    EXAMINATION:  CT ABDOMEN PELVIS WITH CONTRAST; CTA CHEST NON CORONARY (PE STUDIES)    CLINICAL HISTORY:  GI bleed;; Pulmonary embolism (PE) suspected, positive D-dimer;    TECHNIQUE:  Axial CTA of the chest was initially performed with 100 cc Omnipaque 350 IV contrast per CTA PE protocol.  Subsequent 1 minute delayed portal venous phase axial CT images of the abdomen and pelvis were also obtained from the lung bases through the ischial tuberosities, per routine protocol.  Coronal and sagittal reconstructions and axial MIPS were generated.    COMPARISON:  Chest radiograph same day, CT renal stone study 11/14/2012    FINDINGS:  Beam hardening with streak artifact from overlying monitoring leads and contrast bolus within the SVC and left subclavian vein with respiratory motion somewhat limits evaluation.  Timing of contrast bolus is also suboptimal with similar opacification in the pulmonary arterial and venous and also systemic circulations.    CHEST:    Soft tissue structures of the base of the neck are unremarkable.    The esophagus maintains a normal course and caliber. There is no axillary, mediastinal, or hilar lymphadenopathy.    Lower trachea is slightly deviated towards the right secondary to the arch but remains patent.  The proximal airways are patent and the lungs are well expanded.  Mild dependent atelectasis.  Few scattered linear opacities consistent with minimal platelike scarring versus atelectasis.  Minimal biapical pleuroparenchymal scarring with minimal paraseptal emphysema at the lung apices, slightly more so on the right.  There is a 3 mm solid pulmonary nodule within the right lung apex.  No consolidation, pleural effusion, pneumothorax, pleural thickening or pleural calcifications.    The heart is upper limits normal in size and there is  no evidence of pericardial effusion.  No significant coronary arterial calcifications seen.  No cardiac thrombus seen.    VASCULAR:    Three vessel left-sided arch.  The thoracic aorta is slightly tortuous.  Minimal calcific atherosclerosis at the aortic arch.  There is no evidence of aortic dissection, aneurysm, or stenosis.  Minimal scattered calcific atherosclerosis of the abdominal aorta which is slightly tortuous.  Portal vasculature is patent.    The pulmonary arteries distribute normally.  Pulmonary trunk is within normal limits.  There are 4 main pulmonary veins draining to the left atrium.  No saddle embolus or central PE.  There is no convincing evidence of intraluminal filling defect the level of the proximal segmental arteries bilaterally to suggest pulmonary thromboembolism.    ABDOMEN/PELVIS:    The liver is enlarged measuring 20.8 cm in length with diffuse parenchymal hypoattenuation and peripheral sparing.  No focal hepatic abnormality.  There is no intra-or extrahepatic biliary ductal dilatation. The gallbladder, stomach, spleen, pancreas, and adrenal glands are unremarkable.    The kidneys are normal in size and location with symmetric normal enhancement.  No hydronephrosis.  The ureters appear normal in course and caliber without evidence of ureteral dilatation. Urinary bladder is mildly distended with mild circumferential wall thickening and subtle perivesicular stranding.  Prostate and seminal vesicles are within normal limits.  Few scattered punctate pelvic phleboliths noted.    Appendix and terminal ileum are within normal limits.  Several scattered colonic diverticula without convincing evidence of acute diverticulitis.  Mural fat deposition of the cecum and majority of the ascending colon, likely sequela of remote infectious or inflammatory process or chronic laxative use.  The visualized loops of small and large bowel show no evidence of obstruction or inflammation.    There is no ascites,  free fluid, or intraperitoneal free air. There is no evidence of lymph node enlargement in the abdomen or pelvis.    Osseous structures show minimal degenerative change and chronic appearing minimal anterior wedge deformity of a few lower thoracic and upper lumbar vertebral bodies without acute or destructive process seen.  Tiny fat containing umbilical hernia and tiny fat containing left inguinal hernia.                                       CT Abdomen Pelvis With Contrast (Final result)  Result time 04/07/23 13:11:33      Final result by Shon oJnes MD (04/07/23 13:11:33)                   Impression:      1. Technically limited study as above.  No acute cardiopulmonary process.  Specifically, no central PE or evidence of pulmonary thromboembolism through the proximal segmental levels.  Cannot exclude distal segmental or subsegmental pulmonary thromboemboli.  Further evaluation/follow-up as warranted.  2. Urinary bladder mild circumferential wall thickening with subtle perivesicular stranding which may be related to nonspecific cystitis.  Clinical correlation and with urinalysis advised.  3. Mild colonic diverticulosis without diverticulitis.  4. Hepatomegaly and suspected hepatic steatosis.  5. Minimal systemic atherosclerosis.  No evidence of aortic aneurysm or dissection.  6. Right lung apex 3 mm solid pulmonary nodule.  For a solid nodule <6 mm, Fleischner Society 2017 guidelines recommend no routine follow up for a low risk patient, or follow-up with non-contrast chest CT at 12 months in a high risk patient.  7. Few additional findings as above.      Electronically signed by: Shon Jones MD  Date:    04/07/2023  Time:    13:11               Narrative:    EXAMINATION:  CT ABDOMEN PELVIS WITH CONTRAST; CTA CHEST NON CORONARY (PE STUDIES)    CLINICAL HISTORY:  GI bleed;; Pulmonary embolism (PE) suspected, positive D-dimer;    TECHNIQUE:  Axial CTA of the chest was initially performed with 100 cc Omnipaque  350 IV contrast per CTA PE protocol.  Subsequent 1 minute delayed portal venous phase axial CT images of the abdomen and pelvis were also obtained from the lung bases through the ischial tuberosities, per routine protocol.  Coronal and sagittal reconstructions and axial MIPS were generated.    COMPARISON:  Chest radiograph same day, CT renal stone study 11/14/2012    FINDINGS:  Beam hardening with streak artifact from overlying monitoring leads and contrast bolus within the SVC and left subclavian vein with respiratory motion somewhat limits evaluation.  Timing of contrast bolus is also suboptimal with similar opacification in the pulmonary arterial and venous and also systemic circulations.    CHEST:    Soft tissue structures of the base of the neck are unremarkable.    The esophagus maintains a normal course and caliber. There is no axillary, mediastinal, or hilar lymphadenopathy.    Lower trachea is slightly deviated towards the right secondary to the arch but remains patent.  The proximal airways are patent and the lungs are well expanded.  Mild dependent atelectasis.  Few scattered linear opacities consistent with minimal platelike scarring versus atelectasis.  Minimal biapical pleuroparenchymal scarring with minimal paraseptal emphysema at the lung apices, slightly more so on the right.  There is a 3 mm solid pulmonary nodule within the right lung apex.  No consolidation, pleural effusion, pneumothorax, pleural thickening or pleural calcifications.    The heart is upper limits normal in size and there is no evidence of pericardial effusion.  No significant coronary arterial calcifications seen.  No cardiac thrombus seen.    VASCULAR:    Three vessel left-sided arch.  The thoracic aorta is slightly tortuous.  Minimal calcific atherosclerosis at the aortic arch.  There is no evidence of aortic dissection, aneurysm, or stenosis.  Minimal scattered calcific atherosclerosis of the abdominal aorta which is slightly  tortuous.  Portal vasculature is patent.    The pulmonary arteries distribute normally.  Pulmonary trunk is within normal limits.  There are 4 main pulmonary veins draining to the left atrium.  No saddle embolus or central PE.  There is no convincing evidence of intraluminal filling defect the level of the proximal segmental arteries bilaterally to suggest pulmonary thromboembolism.    ABDOMEN/PELVIS:    The liver is enlarged measuring 20.8 cm in length with diffuse parenchymal hypoattenuation and peripheral sparing.  No focal hepatic abnormality.  There is no intra-or extrahepatic biliary ductal dilatation. The gallbladder, stomach, spleen, pancreas, and adrenal glands are unremarkable.    The kidneys are normal in size and location with symmetric normal enhancement.  No hydronephrosis.  The ureters appear normal in course and caliber without evidence of ureteral dilatation. Urinary bladder is mildly distended with mild circumferential wall thickening and subtle perivesicular stranding.  Prostate and seminal vesicles are within normal limits.  Few scattered punctate pelvic phleboliths noted.    Appendix and terminal ileum are within normal limits.  Several scattered colonic diverticula without convincing evidence of acute diverticulitis.  Mural fat deposition of the cecum and majority of the ascending colon, likely sequela of remote infectious or inflammatory process or chronic laxative use.  The visualized loops of small and large bowel show no evidence of obstruction or inflammation.    There is no ascites, free fluid, or intraperitoneal free air. There is no evidence of lymph node enlargement in the abdomen or pelvis.    Osseous structures show minimal degenerative change and chronic appearing minimal anterior wedge deformity of a few lower thoracic and upper lumbar vertebral bodies without acute or destructive process seen.  Tiny fat containing umbilical hernia and tiny fat containing left inguinal hernia.                                        X-Ray Chest AP Portable (Final result)  Result time 04/07/23 08:00:20      Final result by Cristhian Sanford MD (04/07/23 08:00:20)                   Impression:      No convincing evidence of acute cardiopulmonary disease.      Electronically signed by: Cristhian Sanford  Date:    04/07/2023  Time:    08:00               Narrative:    EXAMINATION:  XR CHEST AP PORTABLE    CLINICAL HISTORY:  Hypoglycemia, unspecified    TECHNIQUE:  Single frontal view of the chest was performed.    COMPARISON:  Chest radiograph performed 05/03/2016.    FINDINGS:  Monitoring leads over the chest.  Cardiomediastinal contour appears to be within normal limits.    Lungs appear essentially clear.    No definite pneumothorax or large volume pleural effusion.    No acute findings in the visualized abdomen.    Osseous and soft tissue structures appear without definite acute change.                                       Medications   pantoprazole (PROTONIX) 40 mg in sodium chloride 0.9 % 100 mL IVPB (MB+) (8 mg/hr Intravenous New Bag 4/7/23 0857)   octreotide (SANDOSTATIN) 500 mcg in sodium chloride 0.9% 100 mL infusion (50 mcg/hr Intravenous New Bag 4/7/23 0900)   thiamine tablet 100 mg (has no administration in time range)   folic acid tablet 1 mg (has no administration in time range)   diazePAM tablet 5 mg (has no administration in time range)   glucagon (human recombinant) injection 1 mg (1 mg Intramuscular Given 4/7/23 0445)   sodium chloride 0.9% bolus 2,000 mL 2,000 mL (0 mLs Intravenous Stopped 4/7/23 0823)   pantoprazole injection 40 mg (40 mg Intravenous Given 4/7/23 0604)   acetaminophen tablet 1,000 mg (1,000 mg Oral Given 4/7/23 0640)   LORazepam injection 1 mg (1 mg Intravenous Given 4/7/23 1233)   iohexoL (OMNIPAQUE 350) injection 100 mL (100 mLs Intravenous Given 4/7/23 1216)     Medical Decision Making:   Initial Assessment:   57-year-old male with a past medical history significant for  alcoholism and hypoglycemia presents to the emergency department via EMS with chief complaint of hypoglycemia.  EMS was initially called secondary to patient complaining of flu-like symptoms, but upon arrival glucose was 38.  Patient received D10 EN route and repeat glucose was 68.  Patient states he drank a small amount of alcohol last night and denies chest pain/fever/chills/nausea/vomiting/shortness of breath at this time.    ED Management:  Course of ED stay:  1. Cardiovascular-patient was chest pain-free throughout ED stay.  EKG and monitor readings showed initial small runs of V-tach which resolved after normal saline bolus stabilization of glucose.  2. Pulmonary-patient had no signs or symptoms of respiratory distress a O2 sats were normal on room air.    3. Hematology/infectious disease-CBC showed white blood cell count 21137, but was otherwise reassuring.  Patient has been afebrile.  4. GI/nutrition/renal/endocrine-glucagon was given in the ED and glucose has been within normal limits.  Normal saline bolus was also given.  Patient has episode of hematemesis in the ED and states he has had multiple episodes of hematemesis at home.  He also endorses regular alcohol use.  Protonix was given ED as well as octreotide.  Care of this patient was transferred to Dr. Chaparro at shift change with pending D-dimer/repeat troponin/reassessment/disposition.           ED Course as of 04/07/23 1414   Fri Apr 07, 2023   1409 Patient care taken over from Dr. Lang pending repeat troponin, D-dimer, repeat chemistry.  Patient presented after hypoglycemic episode.  He reports having an episode of hematemesis.  Has been started on Protonix and octreotide.  Initial chemistry with hyperkalemia on a significantly hemolyzed specimen.  Repeat chemistry after IV hydration has potassium of 5.2.  Patient does not have hyperacute T-waves.  Repeat troponin has trended up slightly to 0.032.  Patient denies current chest pain.  D-dimer  significantly elevated.  CTA of the chest does not demonstrate PE.  Patient noted to be tachycardic and tremulous on reassessment.  He has some tongue fasciculations.  CIWA score is approximately 9.  Patient given Ativan and diazepam in the emergency department.  He is to be admitted to Hospital Medicine for further management.    Please put in 30 minutes of critical care due to patient having a high risk of Alcohol withdrawal   Separate from teaching and exclusive of procedure and ekg time  Includes:  Time at bedside  Time reviewing test results  Time discussing case with staff  Time documenting the medical record  Time spent with family members  Time spent with consults  Management  [SG]      ED Course User Index  [SG] Lisa Chaparro MD                 Clinical Impression:   Final diagnoses:  [R00.0] Tachycardia  [K92.2] Upper GI bleed (Primary)  [K92.0] Hematemesis with nausea  [E16.2] Hypoglycemia  [F10.930] Alcohol withdrawal syndrome without complication  [R77.8] Elevated troponin        ED Disposition Condition    Admit Stable                Jefe Lang MD  04/07/23 [id number]       Lisa Chaparro MD  04/07/23 [id number]

## 2023-04-07 NOTE — CONSULTS
"[facility] Note    CC: hematemesis    HPI 57 y.o. male with alcohol abuse, HTN, HLD who presented to the ED via EMS for hypoglycemia. Blood glucose level was 38. He reported sore throat with blood-tinged emesis therefore GI is consulted.    Patient states he had some soreness in his upper throat and vomited yesterday evening and noted streaks of blood in the vomit. No further episodes in th ED. Drinks alcohol daily. No prior EGD. Initially said he takes advil once weekly then said it is tylenol that he takes.     His labs are significant for a stable hgb (13 range), troponin of 0.032, WBC 15K, potassium 5.2     Chart reviewed and summarized here.    Past Medical History  Past Medical History:   Diagnosis Date    Alcohol abuse, daily use     Hypertension        Past Surgical History  Past Surgical History:   Procedure Laterality Date    LEG SURGERY Left     POSTERIOR KNEE CYST REMOVAL       Social History  Social History     Tobacco Use    Smoking status: Some Days     Types: Cigarettes    Smokeless tobacco: Never   Substance Use Topics    Alcohol use: Yes     Comment: DAILY LIQUOR/GIN USE    Drug use: No       Family History  Family History   Problem Relation Age of Onset    Vision loss Father        Review of Systems  ROS negative except as noted above    Physical Examination  /83   Pulse 93   Temp 99 °F (37.2 °C) (Oral)   Resp (!) 29   Ht 5' 9" (1.753 m)   Wt 81.6 kg (180 lb)   SpO2 97%   BMI 26.58 kg/m²   General appearance: alert, cooperative, no distress  HENT: Normocephalic, atraumatic, neck symmetrical, no nasal discharge   Eyes: conjunctivae/corneas clear, PERRL, EOM's intact  Lungs: clear to auscultation bilaterally, no dullness to percussion bilaterally  Heart: regular rate and rhythm without rub; no displacement of the PMI   Abdomen: soft, non-tender; bowel sounds normoactive; no organomegaly  Extremities: extremities symmetric; no clubbing, cyanosis, or edema  Integument: Skin " color, texture, turgor normal; no rashes; hair distrubution normal  Neurologic: Alert and oriented X 3, normal strength, normal coordination and gait  Psychiatric: no pressured speech; normal affect; no evidence of impaired cognition     Labs:  Lab Results   Component Value Date    WBC 15.41 (H) 04/07/2023    HGB 13.4 (L) 04/07/2023    HCT 42.4 04/07/2023     (H) 04/07/2023     04/07/2023         CMP  Sodium   Date Value Ref Range Status   04/07/2023 141 136 - 145 mmol/L Final     Potassium   Date Value Ref Range Status   04/07/2023 5.2 (H) 3.5 - 5.1 mmol/L Final     Chloride   Date Value Ref Range Status   04/07/2023 105 95 - 110 mmol/L Final     CO2   Date Value Ref Range Status   04/07/2023 19 (L) 23 - 29 mmol/L Final     Glucose   Date Value Ref Range Status   04/07/2023 136 (H) 70 - 110 mg/dL Final     BUN   Date Value Ref Range Status   04/07/2023 23 (H) 6 - 20 mg/dL Final     Creatinine   Date Value Ref Range Status   04/07/2023 1.4 0.5 - 1.4 mg/dL Final     Calcium   Date Value Ref Range Status   04/07/2023 8.7 8.7 - 10.5 mg/dL Final     Total Protein   Date Value Ref Range Status   04/07/2023 7.8 6.0 - 8.4 g/dL Final     Albumin   Date Value Ref Range Status   04/07/2023 4.0 3.5 - 5.2 g/dL Final     Total Bilirubin   Date Value Ref Range Status   04/07/2023 0.4 0.1 - 1.0 mg/dL Final     Comment:     For infants and newborns, interpretation of results should be based  on gestational age, weight and in agreement with clinical  observations.    Premature Infant recommended reference ranges:  Up to 24 hours.............<8.0 mg/dL  Up to 48 hours............<12.0 mg/dL  3-5 days..................<15.0 mg/dL  6-29 days.................<15.0 mg/dL       Alkaline Phosphatase   Date Value Ref Range Status   04/07/2023 49 (L) 55 - 135 U/L Final     AST   Date Value Ref Range Status   04/07/2023 163 (H) 10 - 40 U/L Final     ALT   Date Value Ref Range Status   04/07/2023 97 (H) 10 - 44 U/L Final      Anion Gap   Date Value Ref Range Status   04/07/2023 17 (H) 8 - 16 mmol/L Final     eGFR   Date Value Ref Range Status   04/07/2023 58 (A) >60 mL/min/1.73 m^2 Final       Imaging:    I have personally reviewed and interpreted these images.    Assessment/Plan:   56 yo M with alcohol abuse who initially presented with hypoglycemia and reported blood-streaked emesis, one episode. GI consulted for further recommendations.    Hgb is near baseline. No further episodes of emesis since being in the ED. Less likely variceal bleed. It's possible he has esophagitis or a Ruthann-Marley tear. Would treat with IV PPI for now and keep NPO and monitor clinical course. Will determine if inpatient EGD is warranted.        Sarina Shah MD

## 2023-04-07 NOTE — ASSESSMENT & PLAN NOTE
LFTs elevated AST//97 with hx of alcohol use disorder  MDF 15.6 so no indication for steroids at this time  -  monitor

## 2023-04-07 NOTE — SUBJECTIVE & OBJECTIVE
Past Medical History:   Diagnosis Date    Alcohol abuse, daily use     Hypertension        Past Surgical History:   Procedure Laterality Date    LEG SURGERY Left     POSTERIOR KNEE CYST REMOVAL       Review of patient's allergies indicates:  No Known Allergies    No current facility-administered medications on file prior to encounter.     Current Outpatient Medications on File Prior to Encounter   Medication Sig    amlodipine (NORVASC) 10 MG tablet Take 1 tablet (10 mg total) by mouth once daily.    colchicine (COLCRYS) 0.6 mg tablet Take 1 tablet (0.6 mg total) by mouth once daily. for 1 day    indomethacin (INDOCIN) 50 MG capsule Take 1 capsule (50 mg total) by mouth 3 (three) times daily with meals.    losartan-hydrochlorothiazide 100-25 mg (HYZAAR) 100-25 mg per tablet Take 1 tablet by mouth once daily.    meloxicam (MOBIC) 7.5 MG tablet Take 1 tablet (7.5 mg total) by mouth once daily.    simvastatin (ZOCOR) 40 MG tablet Take 40 mg by mouth every evening.     Family History       Problem Relation (Age of Onset)    Vision loss Father          Tobacco Use    Smoking status: Some Days     Types: Cigarettes    Smokeless tobacco: Never   Substance and Sexual Activity    Alcohol use: Yes     Comment: DAILY LIQUOR/GIN USE    Drug use: No    Sexual activity: Not on file     Review of Systems  Objective:     Vital Signs (Most Recent):  Temp: 99 °F (37.2 °C) (04/07/23 1149)  Pulse: 90 (04/07/23 1607)  Resp: (!) 29 (04/07/23 1511)  BP: 135/83 (04/07/23 1602)  SpO2: 97 % (04/07/23 1607)   Vital Signs (24h Range):  Temp:  [98.5 °F (36.9 °C)-99 °F (37.2 °C)] 99 °F (37.2 °C)  Pulse:  [] 90  Resp:  [11-29] 29  SpO2:  [95 %-100 %] 97 %  BP: (110-151)/(60-90) 135/83     Weight: 81.6 kg (180 lb)  Body mass index is 26.58 kg/m².    Physical Exam  Vitals and nursing note reviewed.   Constitutional:       General: He is not in acute distress.     Appearance: Normal appearance. He is ill-appearing.   HENT:      Mouth/Throat:       Pharynx: Oropharynx is clear.   Cardiovascular:      Rate and Rhythm: Regular rhythm. Tachycardia present.      Pulses: Normal pulses.   Pulmonary:      Effort: Pulmonary effort is normal. No respiratory distress.      Breath sounds: No wheezing.   Abdominal:      General: Bowel sounds are normal. There is no distension.      Palpations: Abdomen is soft.   Musculoskeletal:         General: No swelling.   Skin:     General: Skin is warm and dry.   Neurological:      General: No focal deficit present.      Mental Status: He is alert and oriented to person, place, and time.   Psychiatric:         Mood and Affect: Mood normal.      Comments: jittery           Significant Labs: All pertinent labs within the past 24 hours have been reviewed.    Significant Imaging: I have reviewed all pertinent imaging results/findings within the past 24 hours.

## 2023-04-07 NOTE — ASSESSMENT & PLAN NOTE
Unclear if true upper GI bleed, patient states he had some minimally streaks of blood and his Hb is at his baseline  - will continue with octreotide and PPI for now and follow up GI recommendations

## 2023-04-07 NOTE — PHARMACY MED REC
"Admission Medication History     The home medication history was taken by Carly Hernandez.    You may go to "Admission" then "Reconcile Home Medications" tabs to review and/or act upon these items.     The home medication list has been updated by the Pharmacy department.   Please read ALL comments highlighted in yellow.   Please address this information as you see fit.    Feel free to contact us if you have any questions or require assistance.    Medications listed below were obtained from: Patient/family and Analytic software- [employer]  (Not in a hospital admission)      Carly Hernandez  [phone]                 .        "

## 2023-04-07 NOTE — ED NOTES
Assumed care of 58 yo male to ED for flu-like symptoms and hypoglycemia. Pt currently has 2 liters of Nacl infusing, , AAOx4, and tremulous w/ movement. Drinks 2 pints of gin a day, per patient. Pt has partner at bedside.  NAD

## 2023-04-07 NOTE — ASSESSMENT & PLAN NOTE
Very mildly elevated but did increase to 0.032 from normal, no chest pain and ECG reviewed.  - likely demand but will plan to trend out  - keep on telemetry  - do not suspect ACS

## 2023-04-07 NOTE — ED NOTES
Pt presents to ED c/o flu like symptoms, HA, cough and congestion. When EMS arrived pt 's glucose was 38. Pt was given d50 en route. Pt c/o HA at this time.

## 2023-04-08 LAB
ALBUMIN SERPL BCP-MCNC: 3.4 G/DL (ref 3.5–5.2)
ALP SERPL-CCNC: 37 U/L (ref 55–135)
ALT SERPL W/O P-5'-P-CCNC: 66 U/L (ref 10–44)
ANION GAP SERPL CALC-SCNC: 12 MMOL/L (ref 8–16)
AST SERPL-CCNC: 96 U/L (ref 10–40)
BASOPHILS # BLD AUTO: 0.01 K/UL (ref 0–0.2)
BASOPHILS NFR BLD: 0.2 % (ref 0–1.9)
BILIRUB SERPL-MCNC: 0.8 MG/DL (ref 0.1–1)
BUN SERPL-MCNC: 17 MG/DL (ref 6–20)
CALCIUM SERPL-MCNC: 8.6 MG/DL (ref 8.7–10.5)
CHLORIDE SERPL-SCNC: 106 MMOL/L (ref 95–110)
CO2 SERPL-SCNC: 25 MMOL/L (ref 23–29)
CREAT SERPL-MCNC: 1.2 MG/DL (ref 0.5–1.4)
DIFFERENTIAL METHOD: ABNORMAL
EOSINOPHIL # BLD AUTO: 0 K/UL (ref 0–0.5)
EOSINOPHIL NFR BLD: 0.3 % (ref 0–8)
ERYTHROCYTE [DISTWIDTH] IN BLOOD BY AUTOMATED COUNT: 15.9 % (ref 11.5–14.5)
EST. GFR  (NO RACE VARIABLE): >60 ML/MIN/1.73 M^2
GLUCOSE SERPL-MCNC: 121 MG/DL (ref 70–110)
HCT VFR BLD AUTO: 37.1 % (ref 40–54)
HGB BLD-MCNC: 12.6 G/DL (ref 14–18)
IMM GRANULOCYTES # BLD AUTO: 0.02 K/UL (ref 0–0.04)
IMM GRANULOCYTES NFR BLD AUTO: 0.3 % (ref 0–0.5)
LYMPHOCYTES # BLD AUTO: 0.7 K/UL (ref 1–4.8)
LYMPHOCYTES NFR BLD: 11.4 % (ref 18–48)
MAGNESIUM SERPL-MCNC: 1.6 MG/DL (ref 1.6–2.6)
MCH RBC QN AUTO: 33.4 PG (ref 27–31)
MCHC RBC AUTO-ENTMCNC: 34 G/DL (ref 32–36)
MCV RBC AUTO: 98 FL (ref 82–98)
MONOCYTES # BLD AUTO: 1 K/UL (ref 0.3–1)
MONOCYTES NFR BLD: 15.9 % (ref 4–15)
NEUTROPHILS # BLD AUTO: 4.3 K/UL (ref 1.8–7.7)
NEUTROPHILS NFR BLD: 71.9 % (ref 38–73)
NRBC BLD-RTO: 2 /100 WBC
PHOSPHATE SERPL-MCNC: 2.2 MG/DL (ref 2.7–4.5)
PLATELET # BLD AUTO: 141 K/UL (ref 150–450)
PMV BLD AUTO: 9 FL (ref 9.2–12.9)
POCT GLUCOSE: 121 MG/DL (ref 70–110)
POCT GLUCOSE: 121 MG/DL (ref 70–110)
POCT GLUCOSE: 145 MG/DL (ref 70–110)
POCT GLUCOSE: 193 MG/DL (ref 70–110)
POTASSIUM SERPL-SCNC: 3.8 MMOL/L (ref 3.5–5.1)
PROT SERPL-MCNC: 6.3 G/DL (ref 6–8.4)
RBC # BLD AUTO: 3.77 M/UL (ref 4.6–6.2)
SODIUM SERPL-SCNC: 143 MMOL/L (ref 136–145)
TROPONIN I SERPL DL<=0.01 NG/ML-MCNC: 0.01 NG/ML (ref 0–0.03)
TROPONIN I SERPL DL<=0.01 NG/ML-MCNC: 0.02 NG/ML (ref 0–0.03)
TROPONIN I SERPL DL<=0.01 NG/ML-MCNC: 0.02 NG/ML (ref 0–0.03)
WBC # BLD AUTO: 5.99 K/UL (ref 3.9–12.7)

## 2023-04-08 PROCEDURE — G0378 HOSPITAL OBSERVATION PER HR: HCPCS

## 2023-04-08 PROCEDURE — 83735 ASSAY OF MAGNESIUM: CPT | Performed by: STUDENT IN AN ORGANIZED HEALTH CARE EDUCATION/TRAINING PROGRAM

## 2023-04-08 PROCEDURE — C9113 INJ PANTOPRAZOLE SODIUM, VIA: HCPCS | Performed by: INTERNAL MEDICINE

## 2023-04-08 PROCEDURE — 36415 COLL VENOUS BLD VENIPUNCTURE: CPT | Performed by: STUDENT IN AN ORGANIZED HEALTH CARE EDUCATION/TRAINING PROGRAM

## 2023-04-08 PROCEDURE — 63600175 PHARM REV CODE 636 W HCPCS: Performed by: STUDENT IN AN ORGANIZED HEALTH CARE EDUCATION/TRAINING PROGRAM

## 2023-04-08 PROCEDURE — 80053 COMPREHEN METABOLIC PANEL: CPT | Performed by: STUDENT IN AN ORGANIZED HEALTH CARE EDUCATION/TRAINING PROGRAM

## 2023-04-08 PROCEDURE — 85025 COMPLETE CBC W/AUTO DIFF WBC: CPT | Performed by: STUDENT IN AN ORGANIZED HEALTH CARE EDUCATION/TRAINING PROGRAM

## 2023-04-08 PROCEDURE — 96366 THER/PROPH/DIAG IV INF ADDON: CPT

## 2023-04-08 PROCEDURE — 25000003 PHARM REV CODE 250: Performed by: INTERNAL MEDICINE

## 2023-04-08 PROCEDURE — 96374 THER/PROPH/DIAG INJ IV PUSH: CPT | Mod: 59

## 2023-04-08 PROCEDURE — 63600175 PHARM REV CODE 636 W HCPCS: Performed by: INTERNAL MEDICINE

## 2023-04-08 PROCEDURE — 25000003 PHARM REV CODE 250: Performed by: STUDENT IN AN ORGANIZED HEALTH CARE EDUCATION/TRAINING PROGRAM

## 2023-04-08 PROCEDURE — C9113 INJ PANTOPRAZOLE SODIUM, VIA: HCPCS | Performed by: STUDENT IN AN ORGANIZED HEALTH CARE EDUCATION/TRAINING PROGRAM

## 2023-04-08 PROCEDURE — 84100 ASSAY OF PHOSPHORUS: CPT | Performed by: STUDENT IN AN ORGANIZED HEALTH CARE EDUCATION/TRAINING PROGRAM

## 2023-04-08 PROCEDURE — 11000001 HC ACUTE MED/SURG PRIVATE ROOM

## 2023-04-08 PROCEDURE — 84484 ASSAY OF TROPONIN QUANT: CPT | Performed by: STUDENT IN AN ORGANIZED HEALTH CARE EDUCATION/TRAINING PROGRAM

## 2023-04-08 RX ORDER — PANTOPRAZOLE SODIUM 40 MG/10ML
40 INJECTION, POWDER, LYOPHILIZED, FOR SOLUTION INTRAVENOUS 2 TIMES DAILY
Status: DISCONTINUED | OUTPATIENT
Start: 2023-04-08 | End: 2023-04-09 | Stop reason: HOSPADM

## 2023-04-08 RX ADMIN — PANTOPRAZOLE SODIUM 40 MG: 40 INJECTION, POWDER, FOR SOLUTION INTRAVENOUS at 08:04

## 2023-04-08 RX ADMIN — OCTREOTIDE ACETATE 50 MCG/HR: 500 INJECTION, SOLUTION INTRAVENOUS; SUBCUTANEOUS at 05:04

## 2023-04-08 RX ADMIN — PANTOPRAZOLE SODIUM 8 MG/HR: 40 INJECTION, POWDER, FOR SOLUTION INTRAVENOUS at 09:04

## 2023-04-08 RX ADMIN — DIAZEPAM 5 MG: 5 TABLET ORAL at 02:04

## 2023-04-08 RX ADMIN — THIAMINE HCL TAB 100 MG 100 MG: 100 TAB at 09:04

## 2023-04-08 RX ADMIN — ACETAMINOPHEN 650 MG: 325 TABLET ORAL at 08:04

## 2023-04-08 RX ADMIN — FOLIC ACID 1 MG: 1 TABLET ORAL at 09:04

## 2023-04-08 RX ADMIN — DIAZEPAM 5 MG: 5 TABLET ORAL at 11:04

## 2023-04-08 RX ADMIN — PANTOPRAZOLE SODIUM 8 MG/HR: 40 INJECTION, POWDER, FOR SOLUTION INTRAVENOUS at 04:04

## 2023-04-08 NOTE — ASSESSMENT & PLAN NOTE
Unclear if true upper GI bleed, patient states he had some minimally streaks of blood and his Hb is at his baseline  - will continue with octreotide and PPI for now and follow up GI recommendations  - ok to advance diet, does not appear further bleeding

## 2023-04-08 NOTE — PROGRESS NOTES
[facility] Medicine  Progress Note    Patient Name: Idalia Ahn  MRN: [id number]  Patient Class: IP- Inpatient   Admission Date: 4/7/2023  Length of Stay: 1 days  Attending Physician: Jj Kaur MD  Primary Care Provider: Provider [facility]        Subjective:     Principal Problem:Alcoholic hepatitis without ascites        HPI:  Mr. Ahn is a 56 yo M with hx of HLD and HTN who presents to the ED for evaluation of fatigue, weakness and sore throat. He states he drank 1-2 bottles/shots of gin yesterday and started to develop symptoms. Has had a good appetite but yesterday did not eat much.  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  He did not feel well who presented to the ER last night where he then proceeded to vomit.  He tells me this was mainly water with streaks of blood in it.   In the ED, he was found to have an elevated WBC, elevated d-dimer, metabolic acidosis. There was also concerns for GIB per ED. He was started on IV PPI and octreotide in ED. CTA with no definite PE. Admitted to hospital medicine for further management.      Overview/Hospital Course:  Admitted with metabolic acidosis, sore throat and concerns for GIB. Known alcohol use disorder. Given IVF, started on CIWA protocol and GI consulted. On octreotide/PPI drip. Doing better, acidosis resolved and no further evidence of bleeding. Advancing diet, still with sore throat.      Interval History: no acute issues, still having sore throat. No further evidence of bleeding    Review of Systems  Objective:     Vital Signs (Most Recent):  Temp: 98.5 °F (36.9 °C) (04/08/23 0743)  Pulse: 73 (04/08/23 0743)  Resp: 20 (04/08/23 0743)  BP: 135/87 (04/08/23 0743)  SpO2: 98 % (04/08/23 0743) Vital Signs (24h Range):  Temp:  [98.3 °F (36.8 °C)-99.5 °F (37.5 °C)] 98.5 °F (36.9 °C)  Pulse:  [] 73  Resp:  [11-29] 20  SpO2:  [95 %-99 %] 98 %  BP: (118-151)/(67-90) 135/87     Weight: 81.6 kg (180 lb)  Body mass  index is 26.58 kg/m².    Intake/Output Summary (Last 24 hours) at 4/8/2023 1052  Last data filed at 4/8/2023 0500  Gross per 24 hour   Intake 516.16 ml   Output 550 ml   Net -33.84 ml      Physical Exam  Vitals and nursing note reviewed.   Constitutional:       General: He is not in acute distress.     Appearance: Normal appearance. He is ill-appearing.   HENT:      Mouth/Throat:      Pharynx: Oropharynx is clear.   Cardiovascular:      Rate and Rhythm: Regular rhythm. Tachycardia present.      Pulses: Normal pulses.   Pulmonary:      Effort: Pulmonary effort is normal. No respiratory distress.      Breath sounds: No wheezing.   Abdominal:      General: Bowel sounds are normal. There is no distension.      Palpations: Abdomen is soft.   Musculoskeletal:         General: No swelling.   Skin:     General: Skin is warm and dry.   Neurological:      General: No focal deficit present.      Mental Status: He is alert and oriented to person, place, and time.   Psychiatric:         Mood and Affect: Mood normal.       Significant Labs: All pertinent labs within the past 24 hours have been reviewed.    Significant Imaging: I have reviewed all pertinent imaging results/findings within the past 24 hours.      Assessment/Plan:      * Alcoholic hepatitis without ascites  LFTs elevated AST//97 with hx of alcohol use disorder  MDF 15.6 so no indication for steroids at this time  -  monitor      Elevated troponin  Very mildly elevated but did increase to 0.032 from normal, no chest pain and ECG reviewed.  - likely demand but will plan to trend out - trending down to normal  - keep on telemetry  - do not suspect ACS       Leukocytosis  No evidence of infection, suspect reactive  - monitor for now  - now resolved    Upper GI bleed  Unclear if true upper GI bleed, patient states he had some minimally streaks of blood and his Hb is at his baseline  - will continue with octreotide and PPI for now and follow up GI  recommendations  - ok to advance diet, does not appear further bleeding      Alcohol use  Heavy alcohol use with last drink being yesterday.   - Start on valium taper  CIWA protocol       High anion gap metabolic acidosis  Suspect due to alcohol abuse and starvation ketosis. Given fluids and improved to 17.  - continue with IVF and supportive care  - hypoglycemic on admit now resoolved  - acidosis resolved.      Hypoglycemia  Now resolved with dextrose  - suspect due to alcohol use/poor PO intake  - glucose checks - now resolved      VTE Risk Mitigation (From admission, onward)         Ordered     IP VTE LOW RISK PATIENT  Once         04/07/23 1455     Place sequential compression device  Until discontinued         04/07/23 1455                Discharge Planning   DEBORAH:      Code Status: Full Code   Is the patient medically ready for discharge?:     Reason for patient still in hospital (select all that apply): Treatment                     Jj Kaur MD  Department of Hospital Medicine   [facility]

## 2023-04-08 NOTE — HOSPITAL COURSE
Admitted with metabolic acidosis, sore throat and concerns for GIB. Known alcohol use disorder. Given IVF, started on CIWA protocol and GI consulted. On octreotide/PPI drip. Doing better, acidosis resolved and no further evidence of bleeding. Electrolytes replaced as needed. Hgb is near baseline. No further episodes of emesis since being in the ED. Less likely variceal bleed. Advancing diet, and tolerating it. Although still with sore throat but improving. GI suspect  possible he has esophagitis or a Ruthann-Marley tear. Recommended PPI x 8 weeks and outpatient GI follow up for EGD. Will also refer to hepatology for evaluation of alcohol hepatitis.     Patient admits feeling better overall.  No nausea or vomiting.  Still has sore throat, but states that it is improving.  Able to tolerate diet without any difficulty. Pt denies any fever, headaches, vision changes, chest pain, shortness of breath, palpitations, abdominal pain, nausea, vomiting, or any new weaknesses. Feels ready to go home. Patient's exam on discharge was as follow: Patient is alert and oriented, appears in no acute distress, heart with regular rate and rhythm, lungs clear to asculation with non-labored breathing, abdomen soft, and no new weaknesses or focal deficits seen. Bilateral lower extremities without any edema or calf tenderness.     Patient was counseled regarding any abnormal labs, differential diagnosis, treatment options, risk-benefit, lifestyle changes, prognosis, current condition, and medications. Patient was interactive and attentive.  Patient's questions were answered in a respectful and timely manner. Patient was instructed to follow-up with PCP within 1 week and to continue taking medications as prescribed.  Instructed to also follow up with GI for outpatient EGD and further evaluation. Also, extensively discussed the risks, benefits, and side effects of patient's medications. Discussed with patient about any medication changes.  Patient verbalized understanding and agrees to treatment plan.  Patient is stable for discharge.  Patient has no other questions or concerns at this time.  ED precautions discussed with the patient.    Vital signs are stable. Ambulating without any difficulty. Tolerating p.o. intake without any nausea or vomiting. Afebrile for over 24 hours. Patient is in stable condition and has no questions or concerns. Patient will be discharge to home once transportation secured and electrolytes replacement completed . Prescriptions sent to pharmacy.  CM/SW to assist with discharge planning.     Vitals:    04/09/23 0012 04/09/23 0413 04/09/23 0725 04/09/23 1110   BP: 121/86 112/81 127/89 (!) 143/104   BP Location: Right arm Right arm Right arm Right arm   Patient Position: Lying Lying Lying Lying   Pulse: 66 71 65 66   Resp: 18 16 20 20   Temp: 97.8 °F (36.6 °C) 98.4 °F (36.9 °C) 98.3 °F (36.8 °C) 98.4 °F (36.9 °C)   TempSrc: Oral Oral Oral Oral   SpO2: 98% 98% 97% 99%   Weight:       Height:

## 2023-04-08 NOTE — ASSESSMENT & PLAN NOTE
Now resolved with dextrose  - suspect due to alcohol use/poor PO intake  - glucose checks - now resolved

## 2023-04-08 NOTE — ASSESSMENT & PLAN NOTE
Very mildly elevated but did increase to 0.032 from normal, no chest pain and ECG reviewed.  - likely demand but will plan to trend out - trending down to normal  - keep on telemetry  - do not suspect ACS

## 2023-04-08 NOTE — ASSESSMENT & PLAN NOTE
Suspect due to alcohol abuse and starvation ketosis. Given fluids and improved to 17.  - continue with IVF and supportive care  - hypoglycemic on admit now resoolved  - acidosis resolved.

## 2023-04-08 NOTE — PLAN OF CARE
Problem: Adult Inpatient Plan of Care  Goal: Plan of Care Review  Outcome: Ongoing, Progressing  Goal: Patient-Specific Goal (Individualized)  Outcome: Ongoing, Progressing  Goal: Readiness for Transition of Care  Outcome: Ongoing, Progressing     Problem: Fall Injury Risk  Goal: Absence of Fall and Fall-Related Injury  Outcome: Ongoing, Progressing     Pt free from falls or any further trauma through out the shift.. Prescribed medication administered. Compliant of headche, prescribed medication give. Octreotide and Pantoprazole infusing. Pt  NPO. Pt in no distress. Will continue to monitor.

## 2023-04-08 NOTE — SUBJECTIVE & OBJECTIVE
Interval History: no acute issues, still having sore throat. No further evidence of bleeding    Review of Systems  Objective:     Vital Signs (Most Recent):  Temp: 98.5 °F (36.9 °C) (04/08/23 0743)  Pulse: 73 (04/08/23 0743)  Resp: 20 (04/08/23 0743)  BP: 135/87 (04/08/23 0743)  SpO2: 98 % (04/08/23 0743) Vital Signs (24h Range):  Temp:  [98.3 °F (36.8 °C)-99.5 °F (37.5 °C)] 98.5 °F (36.9 °C)  Pulse:  [] 73  Resp:  [11-29] 20  SpO2:  [95 %-99 %] 98 %  BP: (118-151)/(67-90) 135/87     Weight: 81.6 kg (180 lb)  Body mass index is 26.58 kg/m².    Intake/Output Summary (Last 24 hours) at 4/8/2023 1052  Last data filed at 4/8/2023 0500  Gross per 24 hour   Intake 516.16 ml   Output 550 ml   Net -33.84 ml      Physical Exam  Vitals and nursing note reviewed.   Constitutional:       General: He is not in acute distress.     Appearance: Normal appearance. He is ill-appearing.   HENT:      Mouth/Throat:      Pharynx: Oropharynx is clear.   Cardiovascular:      Rate and Rhythm: Regular rhythm. Tachycardia present.      Pulses: Normal pulses.   Pulmonary:      Effort: Pulmonary effort is normal. No respiratory distress.      Breath sounds: No wheezing.   Abdominal:      General: Bowel sounds are normal. There is no distension.      Palpations: Abdomen is soft.   Musculoskeletal:         General: No swelling.   Skin:     General: Skin is warm and dry.   Neurological:      General: No focal deficit present.      Mental Status: He is alert and oriented to person, place, and time.   Psychiatric:         Mood and Affect: Mood normal.       Significant Labs: All pertinent labs within the past 24 hours have been reviewed.    Significant Imaging: I have reviewed all pertinent imaging results/findings within the past 24 hours.

## 2023-04-09 VITALS
OXYGEN SATURATION: 99 % | HEIGHT: 69 IN | RESPIRATION RATE: 20 BRPM | BODY MASS INDEX: 26.66 KG/M2 | TEMPERATURE: 98 F | HEART RATE: 66 BPM | WEIGHT: 180 LBS | DIASTOLIC BLOOD PRESSURE: 104 MMHG | SYSTOLIC BLOOD PRESSURE: 143 MMHG

## 2023-04-09 LAB
ALBUMIN SERPL BCP-MCNC: 3.2 G/DL (ref 3.5–5.2)
ALP SERPL-CCNC: 36 U/L (ref 55–135)
ALT SERPL W/O P-5'-P-CCNC: 83 U/L (ref 10–44)
ANION GAP SERPL CALC-SCNC: 9 MMOL/L (ref 8–16)
AST SERPL-CCNC: 151 U/L (ref 10–40)
BASOPHILS # BLD AUTO: 0.04 K/UL (ref 0–0.2)
BASOPHILS NFR BLD: 1.1 % (ref 0–1.9)
BILIRUB SERPL-MCNC: 0.8 MG/DL (ref 0.1–1)
BUN SERPL-MCNC: 14 MG/DL (ref 6–20)
CALCIUM SERPL-MCNC: 8.3 MG/DL (ref 8.7–10.5)
CHLORIDE SERPL-SCNC: 104 MMOL/L (ref 95–110)
CO2 SERPL-SCNC: 28 MMOL/L (ref 23–29)
CREAT SERPL-MCNC: 1 MG/DL (ref 0.5–1.4)
DIFFERENTIAL METHOD: ABNORMAL
EOSINOPHIL # BLD AUTO: 0.1 K/UL (ref 0–0.5)
EOSINOPHIL NFR BLD: 1.9 % (ref 0–8)
ERYTHROCYTE [DISTWIDTH] IN BLOOD BY AUTOMATED COUNT: 15.7 % (ref 11.5–14.5)
EST. GFR  (NO RACE VARIABLE): >60 ML/MIN/1.73 M^2
GLUCOSE SERPL-MCNC: 148 MG/DL (ref 70–110)
HCT VFR BLD AUTO: 38 % (ref 40–54)
HGB BLD-MCNC: 12.8 G/DL (ref 14–18)
IMM GRANULOCYTES # BLD AUTO: 0.02 K/UL (ref 0–0.04)
IMM GRANULOCYTES NFR BLD AUTO: 0.5 % (ref 0–0.5)
LYMPHOCYTES # BLD AUTO: 0.8 K/UL (ref 1–4.8)
LYMPHOCYTES NFR BLD: 20.6 % (ref 18–48)
MAGNESIUM SERPL-MCNC: 1.6 MG/DL (ref 1.6–2.6)
MAGNESIUM SERPL-MCNC: 1.6 MG/DL (ref 1.6–2.6)
MCH RBC QN AUTO: 32.7 PG (ref 27–31)
MCHC RBC AUTO-ENTMCNC: 33.7 G/DL (ref 32–36)
MCV RBC AUTO: 97 FL (ref 82–98)
MONOCYTES # BLD AUTO: 0.7 K/UL (ref 0.3–1)
MONOCYTES NFR BLD: 17.5 % (ref 4–15)
NEUTROPHILS # BLD AUTO: 2.2 K/UL (ref 1.8–7.7)
NEUTROPHILS NFR BLD: 58.4 % (ref 38–73)
NRBC BLD-RTO: 3 /100 WBC
PHOSPHATE SERPL-MCNC: 1.8 MG/DL (ref 2.7–4.5)
PHOSPHATE SERPL-MCNC: 3.7 MG/DL (ref 2.7–4.5)
PLATELET # BLD AUTO: 137 K/UL (ref 150–450)
PMV BLD AUTO: 9.4 FL (ref 9.2–12.9)
POCT GLUCOSE: 119 MG/DL (ref 70–110)
POTASSIUM SERPL-SCNC: 3.6 MMOL/L (ref 3.5–5.1)
PROT SERPL-MCNC: 6.1 G/DL (ref 6–8.4)
RBC # BLD AUTO: 3.91 M/UL (ref 4.6–6.2)
SODIUM SERPL-SCNC: 141 MMOL/L (ref 136–145)
WBC # BLD AUTO: 3.78 K/UL (ref 3.9–12.7)

## 2023-04-09 PROCEDURE — 36415 COLL VENOUS BLD VENIPUNCTURE: CPT | Performed by: STUDENT IN AN ORGANIZED HEALTH CARE EDUCATION/TRAINING PROGRAM

## 2023-04-09 PROCEDURE — 63600175 PHARM REV CODE 636 W HCPCS: Performed by: STUDENT IN AN ORGANIZED HEALTH CARE EDUCATION/TRAINING PROGRAM

## 2023-04-09 PROCEDURE — C9113 INJ PANTOPRAZOLE SODIUM, VIA: HCPCS | Performed by: STUDENT IN AN ORGANIZED HEALTH CARE EDUCATION/TRAINING PROGRAM

## 2023-04-09 PROCEDURE — 25000003 PHARM REV CODE 250

## 2023-04-09 PROCEDURE — 25000003 PHARM REV CODE 250: Performed by: INTERNAL MEDICINE

## 2023-04-09 PROCEDURE — 25000003 PHARM REV CODE 250: Performed by: STUDENT IN AN ORGANIZED HEALTH CARE EDUCATION/TRAINING PROGRAM

## 2023-04-09 PROCEDURE — 63600175 PHARM REV CODE 636 W HCPCS: Mod: JA | Performed by: INTERNAL MEDICINE

## 2023-04-09 PROCEDURE — G0378 HOSPITAL OBSERVATION PER HR: HCPCS

## 2023-04-09 PROCEDURE — 83735 ASSAY OF MAGNESIUM: CPT | Performed by: STUDENT IN AN ORGANIZED HEALTH CARE EDUCATION/TRAINING PROGRAM

## 2023-04-09 PROCEDURE — 83735 ASSAY OF MAGNESIUM: CPT | Mod: 91 | Performed by: STUDENT IN AN ORGANIZED HEALTH CARE EDUCATION/TRAINING PROGRAM

## 2023-04-09 PROCEDURE — 96367 TX/PROPH/DG ADDL SEQ IV INF: CPT

## 2023-04-09 PROCEDURE — 85025 COMPLETE CBC W/AUTO DIFF WBC: CPT | Performed by: STUDENT IN AN ORGANIZED HEALTH CARE EDUCATION/TRAINING PROGRAM

## 2023-04-09 PROCEDURE — 96376 TX/PRO/DX INJ SAME DRUG ADON: CPT

## 2023-04-09 PROCEDURE — 96368 THER/DIAG CONCURRENT INF: CPT

## 2023-04-09 PROCEDURE — 84100 ASSAY OF PHOSPHORUS: CPT | Mod: 91 | Performed by: STUDENT IN AN ORGANIZED HEALTH CARE EDUCATION/TRAINING PROGRAM

## 2023-04-09 PROCEDURE — 84100 ASSAY OF PHOSPHORUS: CPT | Performed by: STUDENT IN AN ORGANIZED HEALTH CARE EDUCATION/TRAINING PROGRAM

## 2023-04-09 PROCEDURE — 80053 COMPREHEN METABOLIC PANEL: CPT | Performed by: STUDENT IN AN ORGANIZED HEALTH CARE EDUCATION/TRAINING PROGRAM

## 2023-04-09 PROCEDURE — 96366 THER/PROPH/DIAG IV INF ADDON: CPT

## 2023-04-09 RX ORDER — LANOLIN ALCOHOL/MO/W.PET/CERES
100 CREAM (GRAM) TOPICAL DAILY
Qty: 30 TABLET | Refills: 0 | Status: SHIPPED | OUTPATIENT
Start: 2023-04-10 | End: 2023-05-10

## 2023-04-09 RX ORDER — PANTOPRAZOLE SODIUM 40 MG/1
40 TABLET, DELAYED RELEASE ORAL DAILY
Qty: 30 TABLET | Refills: 1 | Status: SHIPPED | OUTPATIENT
Start: 2023-04-09 | End: 2023-06-08

## 2023-04-09 RX ORDER — FOLIC ACID 1 MG/1
1 TABLET ORAL DAILY
Qty: 30 TABLET | Refills: 0 | Status: SHIPPED | OUTPATIENT
Start: 2023-04-10 | End: 2023-05-10

## 2023-04-09 RX ORDER — DIAZEPAM 5 MG/1
TABLET ORAL
Qty: 3 TABLET | Refills: 0 | Status: SHIPPED | OUTPATIENT
Start: 2023-04-09 | End: 2023-04-11

## 2023-04-09 RX ORDER — DIAZEPAM 5 MG/1
5 TABLET ORAL EVERY 12 HOURS
Status: DISCONTINUED | OUTPATIENT
Start: 2023-04-09 | End: 2023-04-09 | Stop reason: HOSPADM

## 2023-04-09 RX ORDER — MAGNESIUM SULFATE HEPTAHYDRATE 40 MG/ML
2 INJECTION, SOLUTION INTRAVENOUS ONCE
Status: COMPLETED | OUTPATIENT
Start: 2023-04-09 | End: 2023-04-09

## 2023-04-09 RX ADMIN — THIAMINE HCL TAB 100 MG 100 MG: 100 TAB at 09:04

## 2023-04-09 RX ADMIN — FOLIC ACID 1 MG: 1 TABLET ORAL at 09:04

## 2023-04-09 RX ADMIN — OCTREOTIDE ACETATE 50 MCG/HR: 500 INJECTION, SOLUTION INTRAVENOUS; SUBCUTANEOUS at 06:04

## 2023-04-09 RX ADMIN — MAGNESIUM SULFATE HEPTAHYDRATE 2 G: 40 INJECTION, SOLUTION INTRAVENOUS at 09:04

## 2023-04-09 RX ADMIN — PANTOPRAZOLE SODIUM 40 MG: 40 INJECTION, POWDER, FOR SOLUTION INTRAVENOUS at 09:04

## 2023-04-09 RX ADMIN — HYPROMELLOSE 2910 1 DROP: 5 SOLUTION OPHTHALMIC at 06:04

## 2023-04-09 RX ADMIN — SODIUM PHOSPHATE, MONOBASIC, MONOHYDRATE AND SODIUM PHOSPHATE, DIBASIC, ANHYDROUS 30 MMOL: 142; 276 INJECTION, SOLUTION INTRAVENOUS at 09:04

## 2023-04-09 RX ADMIN — DIAZEPAM 5 MG: 5 TABLET ORAL at 06:04

## 2023-04-09 NOTE — PLAN OF CARE
Patient slept well overnight. Remains on Octreotide infusion. Moving well independently in room with no issue. Patient does verbalize burning/discomfort in eyes. RN will reach out to service to assess.     Problem: Adult Inpatient Plan of Care  Goal: Optimal Comfort and Wellbeing  Outcome: Ongoing, Progressing     Problem: Fall Injury Risk  Goal: Absence of Fall and Fall-Related Injury  Outcome: Ongoing, Progressing

## 2023-04-10 ENCOUNTER — TELEPHONE (OUTPATIENT)
Dept: ENDOSCOPY | Facility: HOSPITAL | Age: 58
End: 2023-04-10
Payer: MEDICAID

## 2023-04-10 DIAGNOSIS — K92.0 COFFEE GROUND EMESIS: Primary | ICD-10-CM

## 2023-04-10 NOTE — TELEPHONE ENCOUNTER
"----- Message from Sarina Shah MD sent at 2023  8:53 AM CDT -----  Procedure: EGD    Diagnosis: Coffee ground emesis    Procedure Timin-4 weeks    #If within 4 weeks selected, please moo as high priority#    #If greater than 12 weeks, please select "5-12 weeks" and delay sending until 2 months prior to requested date#     Provider: Any GI provider    Location: [facility]    Additional Scheduling Information: No scheduling concerns    Prep Specifications:N/A    Have you attached a patient to this message: yes     "

## 2023-04-10 NOTE — DISCHARGE SUMMARY
[facility] Medicine  Discharge Summary      Patient Name: Idalia Ahn  MRN: [id number]  [facility]: [id number]  Patient Class: IP- Inpatient  Admission Date: 4/7/2023  Hospital Length of Stay: 2 days  Discharge Date and Time: 4/9/2023  6:10 PM  Attending Physician: No att. providers found   Discharging Provider: Kath Way DO  Primary Care Provider: Radha Notinsystem    Primary Care Team: Networked reference to record PCT     HPI:   Mr. Ahn is a 56 yo M with hx of HLD and HTN who presents to the ED for evaluation of fatigue, weakness and sore throat. He states he drank 1-2 bottles/shots of gin yesterday and started to develop symptoms. Has had a good appetite but yesterday did not eat much.  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  He did not feel well who presented to the ER last night where he then proceeded to vomit.  He tells me this was mainly water with streaks of blood in it.   In the ED, he was found to have an elevated WBC, elevated d-dimer, metabolic acidosis. There was also concerns for GIB per ED. He was started on IV PPI and octreotide in ED. CTA with no definite PE. Admitted to hospital medicine for further management.      * No surgery found *      Hospital Course:   Admitted with metabolic acidosis, sore throat and concerns for GIB. Known alcohol use disorder. Given IVF, started on CIWA protocol and GI consulted. On octreotide/PPI drip. Doing better, acidosis resolved and no further evidence of bleeding. Electrolytes replaced as needed. Hgb is near baseline. No further episodes of emesis since being in the ED. Less likely variceal bleed. Advancing diet, and tolerating it. Although still with sore throat but improving. GI suspect  possible he has esophagitis or a Ruthann-Marley tear. Recommended PPI x 8 weeks and outpatient GI follow up for EGD. Will also refer to hepatology for evaluation of alcohol hepatitis.     Patient admits feeling better overall.  No  nausea or vomiting.  Still has sore throat, but states that it is improving.  Able to tolerate diet without any difficulty. Pt denies any fever, headaches, vision changes, chest pain, shortness of breath, palpitations, abdominal pain, nausea, vomiting, or any new weaknesses. Feels ready to go home. Patient's exam on discharge was as follow: Patient is alert and oriented, appears in no acute distress, heart with regular rate and rhythm, lungs clear to asculation with non-labored breathing, abdomen soft, and no new weaknesses or focal deficits seen. Bilateral lower extremities without any edema or calf tenderness.     Patient was counseled regarding any abnormal labs, differential diagnosis, treatment options, risk-benefit, lifestyle changes, prognosis, current condition, and medications. Patient was interactive and attentive.  Patient's questions were answered in a respectful and timely manner. Patient was instructed to follow-up with PCP within 1 week and to continue taking medications as prescribed.  Instructed to also follow up with GI for outpatient EGD and further evaluation. Also, extensively discussed the risks, benefits, and side effects of patient's medications. Discussed with patient about any medication changes. Patient verbalized understanding and agrees to treatment plan.  Patient is stable for discharge.  Patient has no other questions or concerns at this time.  ED precautions discussed with the patient.    Vital signs are stable. Ambulating without any difficulty. Tolerating p.o. intake without any nausea or vomiting. Afebrile for over 24 hours. Patient is in stable condition and has no questions or concerns. Patient will be discharge to home once transportation secured and electrolytes replacement completed . Prescriptions sent to pharmacy.  CM/SW to assist with discharge planning.     Vitals:    04/09/23 0012 04/09/23 0413 04/09/23 0725 04/09/23 1110   BP: 121/86 112/81 127/89 (!) 143/104   BP  Location: Right arm Right arm Right arm Right arm   Patient Position: Lying Lying Lying Lying   Pulse: 66 71 65 66   Resp: 18 16 20 20   Temp: 97.8 °F (36.6 °C) 98.4 °F (36.9 °C) 98.3 °F (36.8 °C) 98.4 °F (36.9 °C)   TempSrc: Oral Oral Oral Oral   SpO2: 98% 98% 97% 99%   Weight:       Height:                  Goals of Care Treatment Preferences:  Code Status: Full Code      Consults:   Consults (From admission, onward)        Status Ordering Provider     Inpatient consult to Gastroenterology  Once        Provider:  Sarina Shah MD    Completed YA FAM          No new Assessment & Plan notes have been filed under this hospital service since the last note was generated.  Service: Hospital Medicine    Final Active Diagnoses:    Diagnosis Date Noted POA    PRINCIPAL PROBLEM:  Alcoholic hepatitis without ascites [K70.10] 04/07/2023 Yes    Upper GI bleed [K92.2] 04/07/2023 Yes    Leukocytosis [D72.829] 04/07/2023 Yes    Elevated troponin [R77.8] 04/07/2023 Yes    Alcohol use [Z78.9] 12/20/2013 Yes    High anion gap metabolic acidosis [E87.29] 12/20/2013 Yes    Hypoglycemia [E16.2] 12/20/2013 Yes      Problems Resolved During this Admission:       Discharged Condition: stable    Disposition: Home or Self Care    Follow Up:   Follow-up Information     Sarina Shah MD Follow up in 2 month(s).    Specialty: Gastroenterology  Contact information:  [address]  [address]  [facility] [address]  [phone]             [facility]. Schedule an appointment as soon as possible for a visit in 1 week(s).    Why: for Hospital Follow up  Contact information:  [address] [place] [address]  [phone]                       Patient Instructions:      Ambulatory referral/consult to Hepatology   Standing Status: Future   Referral Priority: Routine Referral Type: Consultation   Referral Reason: Specialty Services Required   Requested Specialty: Hepatology   Number of Visits Requested: 1     Diet  Cardiac     Notify your health care provider if you experience any of the following:  persistent nausea and vomiting or diarrhea     Notify your health care provider if you experience any of the following:  severe uncontrolled pain     Notify your health care provider if you experience any of the following:  persistent dizziness, light-headedness, or visual disturbances     Notify your health care provider if you experience any of the following:  increased confusion or weakness     Activity as tolerated       Significant Diagnostic Studies: Labs: All labs within the past 24 hours have been reviewed       Recent Results (from the past 100 hour(s))   CBC Auto Differential    Collection Time: 04/07/23  5:07 AM   Result Value Ref Range    WBC 15.41 (H) 3.90 - 12.70 K/uL    RBC 4.09 (L) 4.60 - 6.20 M/uL    Hemoglobin 13.4 (L) 14.0 - 18.0 g/dL    Hematocrit 42.4 40.0 - 54.0 %     (H) 82 - 98 fL    MCH 32.8 (H) 27.0 - 31.0 pg    MCHC 31.6 (L) 32.0 - 36.0 g/dL    RDW 17.6 (H) 11.5 - 14.5 %    Platelets 236 150 - 450 K/uL    MPV 11.9 9.2 - 12.9 fL    Immature Granulocytes 0.6 (H) 0.0 - 0.5 %    Gran # (ANC) 12.6 (H) 1.8 - 7.7 K/uL    Immature Grans (Abs) 0.10 (H) 0.00 - 0.04 K/uL    Lymph # 0.8 (L) 1.0 - 4.8 K/uL    Mono # 1.9 (H) 0.3 - 1.0 K/uL    Eos # 0.0 0.0 - 0.5 K/uL    Baso # 0.06 0.00 - 0.20 K/uL    nRBC 0 0 /100 WBC    Gran % 81.4 (H) 38.0 - 73.0 %    Lymph % 5.5 (L) 18.0 - 48.0 %    Mono % 12.1 4.0 - 15.0 %    Eosinophil % 0.0 0.0 - 8.0 %    Basophil % 0.4 0.0 - 1.9 %    Differential Method Automated    Comprehensive Metabolic Panel    Collection Time: 04/07/23  5:07 AM   Result Value Ref Range    Sodium 145 136 - 145 mmol/L    Potassium 5.8 (H) 3.5 - 5.1 mmol/L    Chloride 102 95 - 110 mmol/L    CO2 8 (LL) 23 - 29 mmol/L    Glucose 100 70 - 110 mg/dL    BUN 24 (H) 6 - 20 mg/dL    Creatinine 1.3 0.5 - 1.4 mg/dL    Calcium 9.2 8.7 - 10.5 mg/dL    Total Protein 7.8 6.0 - 8.4 g/dL    Albumin 4.0 3.5 - 5.2 g/dL     Total Bilirubin 0.4 0.1 - 1.0 mg/dL    Alkaline Phosphatase 49 (L) 55 - 135 U/L     (H) 10 - 40 U/L    ALT 97 (H) 10 - 44 U/L    Anion Gap 35 (H) 8 - 16 mmol/L    eGFR >60 >60 mL/min/1.73 m^2   Troponin I    Collection Time: 04/07/23  5:07 AM   Result Value Ref Range    Troponin I 0.017 0.000 - 0.026 ng/mL   POCT glucose    Collection Time: 04/07/23  5:07 AM   Result Value Ref Range    POCT Glucose 106 70 - 110 mg/dL   Brain natriuretic peptide    Collection Time: 04/07/23  5:09 AM   Result Value Ref Range    BNP 36 0 - 99 pg/mL   POCT Influenza A/B Molecular    Collection Time: 04/07/23  8:01 AM   Result Value Ref Range    POC Molecular Influenza A Ag Negative Negative, Not Reported    POC Molecular Influenza B Ag Negative Negative, Not Reported     Acceptable Yes    POCT COVID-19 Rapid Screening    Collection Time: 04/07/23  8:02 AM   Result Value Ref Range    POC Rapid COVID Negative Negative     Acceptable Yes    POCT glucose    Collection Time: 04/07/23  8:56 AM   Result Value Ref Range    POCT Glucose 96 70 - 110 mg/dL   Troponin I    Collection Time: 04/07/23  9:01 AM   Result Value Ref Range    Troponin I 0.032 (H) 0.000 - 0.026 ng/mL   Urinalysis, Reflex to Urine Culture Urine, Clean Catch    Collection Time: 04/07/23  9:25 AM    Specimen: Urine   Result Value Ref Range    Specimen UA Urine, Clean Catch     Color, UA Colorless (A) Yellow, Straw, Cecelia    Appearance, UA Clear Clear    pH, UA 5.0 5.0 - 8.0    Specific Gravity, UA 1.015 1.005 - 1.030    Protein, UA Trace (A) Negative    Glucose, UA Negative Negative    Ketones, UA 2+ (A) Negative    Bilirubin (UA) Negative Negative    Occult Blood UA 1+ (A) Negative    Nitrite, UA Negative Negative    Urobilinogen, UA Negative <2.0 EU/dL    Leukocytes, UA Negative Negative   Urinalysis Microscopic    Collection Time: 04/07/23  9:25 AM   Result Value Ref Range    RBC, UA 0 0 - 4 /hpf    Microscopic Comment SEE COMMENT     Drug screen panel, emergency    Collection Time: 04/07/23  9:25 AM   Result Value Ref Range    Benzodiazepines Negative Negative    Methadone metabolites Negative Negative    Cocaine (Metab.) Negative Negative    Opiate Scrn, Ur Negative Negative    Barbiturate Screen, Ur Negative Negative    Amphetamine Screen, Ur Negative Negative    THC Negative Negative    Phencyclidine Negative Negative    Creatinine, Urine 53.9 23.0 - 375.0 mg/dL    Toxicology Information SEE COMMENT    Ethanol    Collection Time: 04/07/23 10:23 AM   Result Value Ref Range    Alcohol, Serum <10 <10 mg/dL   Basic metabolic panel    Collection Time: 04/07/23 10:23 AM   Result Value Ref Range    Sodium 141 136 - 145 mmol/L    Potassium 5.2 (H) 3.5 - 5.1 mmol/L    Chloride 105 95 - 110 mmol/L    CO2 19 (L) 23 - 29 mmol/L    Glucose 136 (H) 70 - 110 mg/dL    BUN 23 (H) 6 - 20 mg/dL    Creatinine 1.4 0.5 - 1.4 mg/dL    Calcium 8.7 8.7 - 10.5 mg/dL    Anion Gap 17 (H) 8 - 16 mmol/L    eGFR 58 (A) >60 mL/min/1.73 m^2   D dimer, quantitative    Collection Time: 04/07/23 10:23 AM   Result Value Ref Range    D-Dimer >33.00 (H) <0.50 mg/L FEU   Magnesium    Collection Time: 04/07/23 10:23 AM   Result Value Ref Range    Magnesium 1.4 (L) 1.6 - 2.6 mg/dL   PT/INR    Collection Time: 04/07/23  3:09 PM   Result Value Ref Range    Prothrombin Time 13.3 (H) 9.0 - 12.5 sec    INR 1.3 (H) 0.8 - 1.2   Troponin I    Collection Time: 04/07/23  5:23 PM   Result Value Ref Range    Troponin I 0.033 (H) 0.000 - 0.026 ng/mL   Type & Screen    Collection Time: 04/07/23  8:23 PM   Result Value Ref Range    Group & Rh A POS     Indirect Jaylen NEG     Specimen Outdate 04/10/2023 23:59    POCT glucose    Collection Time: 04/07/23  8:52 PM   Result Value Ref Range    POCT Glucose 210 (H) 70 - 110 mg/dL   Troponin I    Collection Time: 04/07/23 11:35 PM   Result Value Ref Range    Troponin I 0.021 0.000 - 0.026 ng/mL   Comprehensive Metabolic Panel (CMP)    Collection Time:  04/08/23  5:28 AM   Result Value Ref Range    Sodium 143 136 - 145 mmol/L    Potassium 3.8 3.5 - 5.1 mmol/L    Chloride 106 95 - 110 mmol/L    CO2 25 23 - 29 mmol/L    Glucose 121 (H) 70 - 110 mg/dL    BUN 17 6 - 20 mg/dL    Creatinine 1.2 0.5 - 1.4 mg/dL    Calcium 8.6 (L) 8.7 - 10.5 mg/dL    Total Protein 6.3 6.0 - 8.4 g/dL    Albumin 3.4 (L) 3.5 - 5.2 g/dL    Total Bilirubin 0.8 0.1 - 1.0 mg/dL    Alkaline Phosphatase 37 (L) 55 - 135 U/L    AST 96 (H) 10 - 40 U/L    ALT 66 (H) 10 - 44 U/L    Anion Gap 12 8 - 16 mmol/L    eGFR >60 >60 mL/min/1.73 m^2   Magnesium    Collection Time: 04/08/23  5:28 AM   Result Value Ref Range    Magnesium 1.6 1.6 - 2.6 mg/dL   Phosphorus    Collection Time: 04/08/23  5:28 AM   Result Value Ref Range    Phosphorus 2.2 (L) 2.7 - 4.5 mg/dL   CBC with Automated Differential    Collection Time: 04/08/23  5:28 AM   Result Value Ref Range    WBC 5.99 3.90 - 12.70 K/uL    RBC 3.77 (L) 4.60 - 6.20 M/uL    Hemoglobin 12.6 (L) 14.0 - 18.0 g/dL    Hematocrit 37.1 (L) 40.0 - 54.0 %    MCV 98 82 - 98 fL    MCH 33.4 (H) 27.0 - 31.0 pg    MCHC 34.0 32.0 - 36.0 g/dL    RDW 15.9 (H) 11.5 - 14.5 %    Platelets 141 (L) 150 - 450 K/uL    MPV 9.0 (L) 9.2 - 12.9 fL    Immature Granulocytes 0.3 0.0 - 0.5 %    Gran # (ANC) 4.3 1.8 - 7.7 K/uL    Immature Grans (Abs) 0.02 0.00 - 0.04 K/uL    Lymph # 0.7 (L) 1.0 - 4.8 K/uL    Mono # 1.0 0.3 - 1.0 K/uL    Eos # 0.0 0.0 - 0.5 K/uL    Baso # 0.01 0.00 - 0.20 K/uL    nRBC 2 (A) 0 /100 WBC    Gran % 71.9 38.0 - 73.0 %    Lymph % 11.4 (L) 18.0 - 48.0 %    Mono % 15.9 (H) 4.0 - 15.0 %    Eosinophil % 0.3 0.0 - 8.0 %    Basophil % 0.2 0.0 - 1.9 %    Differential Method Automated    Troponin I    Collection Time: 04/08/23  5:28 AM   Result Value Ref Range    Troponin I 0.016 0.000 - 0.026 ng/mL   POCT glucose    Collection Time: 04/08/23  8:24 AM   Result Value Ref Range    POCT Glucose 121 (H) 70 - 110 mg/dL   POCT glucose    Collection Time: 04/08/23 11:37 AM    Result Value Ref Range    POCT Glucose 121 (H) 70 - 110 mg/dL   Troponin I    Collection Time: 04/08/23  2:13 PM   Result Value Ref Range    Troponin I 0.014 0.000 - 0.026 ng/mL   POCT glucose    Collection Time: 04/08/23  4:13 PM   Result Value Ref Range    POCT Glucose 193 (H) 70 - 110 mg/dL   POCT glucose    Collection Time: 04/08/23  8:06 PM   Result Value Ref Range    POCT Glucose 145 (H) 70 - 110 mg/dL   Comprehensive Metabolic Panel (CMP)    Collection Time: 04/09/23  4:27 AM   Result Value Ref Range    Sodium 141 136 - 145 mmol/L    Potassium 3.6 3.5 - 5.1 mmol/L    Chloride 104 95 - 110 mmol/L    CO2 28 23 - 29 mmol/L    Glucose 148 (H) 70 - 110 mg/dL    BUN 14 6 - 20 mg/dL    Creatinine 1.0 0.5 - 1.4 mg/dL    Calcium 8.3 (L) 8.7 - 10.5 mg/dL    Total Protein 6.1 6.0 - 8.4 g/dL    Albumin 3.2 (L) 3.5 - 5.2 g/dL    Total Bilirubin 0.8 0.1 - 1.0 mg/dL    Alkaline Phosphatase 36 (L) 55 - 135 U/L     (H) 10 - 40 U/L    ALT 83 (H) 10 - 44 U/L    Anion Gap 9 8 - 16 mmol/L    eGFR >60 >60 mL/min/1.73 m^2   Magnesium    Collection Time: 04/09/23  4:27 AM   Result Value Ref Range    Magnesium 1.6 1.6 - 2.6 mg/dL   Phosphorus    Collection Time: 04/09/23  4:27 AM   Result Value Ref Range    Phosphorus 1.8 (L) 2.7 - 4.5 mg/dL   CBC with Automated Differential    Collection Time: 04/09/23  4:27 AM   Result Value Ref Range    WBC 3.78 (L) 3.90 - 12.70 K/uL    RBC 3.91 (L) 4.60 - 6.20 M/uL    Hemoglobin 12.8 (L) 14.0 - 18.0 g/dL    Hematocrit 38.0 (L) 40.0 - 54.0 %    MCV 97 82 - 98 fL    MCH 32.7 (H) 27.0 - 31.0 pg    MCHC 33.7 32.0 - 36.0 g/dL    RDW 15.7 (H) 11.5 - 14.5 %    Platelets 137 (L) 150 - 450 K/uL    MPV 9.4 9.2 - 12.9 fL    Immature Granulocytes 0.5 0.0 - 0.5 %    Gran # (ANC) 2.2 1.8 - 7.7 K/uL    Immature Grans (Abs) 0.02 0.00 - 0.04 K/uL    Lymph # 0.8 (L) 1.0 - 4.8 K/uL    Mono # 0.7 0.3 - 1.0 K/uL    Eos # 0.1 0.0 - 0.5 K/uL    Baso # 0.04 0.00 - 0.20 K/uL    nRBC 3 (A) 0 /100 WBC    Gran %  58.4 38.0 - 73.0 %    Lymph % 20.6 18.0 - 48.0 %    Mono % 17.5 (H) 4.0 - 15.0 %    Eosinophil % 1.9 0.0 - 8.0 %    Basophil % 1.1 0.0 - 1.9 %    Differential Method Automated    POCT glucose    Collection Time: 04/09/23 11:09 AM   Result Value Ref Range    POCT Glucose 119 (H) 70 - 110 mg/dL   Magnesium    Collection Time: 04/09/23  2:58 PM   Result Value Ref Range    Magnesium 1.6 1.6 - 2.6 mg/dL   Phosphorus    Collection Time: 04/09/23  2:58 PM   Result Value Ref Range    Phosphorus 3.7 2.7 - 4.5 mg/dL       Microbiology Results (last 7 days)     ** No results found for the last 168 hours. **          Imaging Results          US Lower Extremity Veins Bilateral (Final result)  Result time 04/07/23 17:05:05    Final result by Ratna Lantigua MD (04/07/23 17:05:05)                 Impression:      No evidence of deep venous thrombosis in either lower extremity.      Electronically signed by: Ratna Lantigua MD  Date:    04/07/2023  Time:    17:05             Narrative:    EXAMINATION:  US LOWER EXTREMITY VEINS BILATERAL    CLINICAL HISTORY:  eval for DVT;    TECHNIQUE:  Duplex and color flow Doppler and dynamic compression was performed of the bilateral lower extremity veins was performed.    COMPARISON:  None    FINDINGS:  Right thigh veins: The common femoral, femoral, popliteal, upper greater saphenous, and deep femoral veins are patent and free of thrombus. The veins are normally compressible and have normal phasic flow and augmentation response.    Right calf veins: The visualized calf veins are patent.    Left thigh veins: The common femoral, femoral, popliteal, upper greater saphenous, and deep femoral veins are patent and free of thrombus. The veins are normally compressible and have normal phasic flow and augmentation response.    Left calf veins: The visualized calf veins are patent.    Miscellaneous: None                               CTA Chest Non-Coronary (PE Studies) (Final result)  Result time  04/07/23 13:11:33    Final result by Shon Jones MD (04/07/23 13:11:33)                 Impression:      1. Technically limited study as above.  No acute cardiopulmonary process.  Specifically, no central PE or evidence of pulmonary thromboembolism through the proximal segmental levels.  Cannot exclude distal segmental or subsegmental pulmonary thromboemboli.  Further evaluation/follow-up as warranted.  2. Urinary bladder mild circumferential wall thickening with subtle perivesicular stranding which may be related to nonspecific cystitis.  Clinical correlation and with urinalysis advised.  3. Mild colonic diverticulosis without diverticulitis.  4. Hepatomegaly and suspected hepatic steatosis.  5. Minimal systemic atherosclerosis.  No evidence of aortic aneurysm or dissection.  6. Right lung apex 3 mm solid pulmonary nodule.  For a solid nodule <6 mm, Fleischner Society 2017 guidelines recommend no routine follow up for a low risk patient, or follow-up with non-contrast chest CT at 12 months in a high risk patient.  7. Few additional findings as above.      Electronically signed by: Shon Jones MD  Date:    04/07/2023  Time:    13:11             Narrative:    EXAMINATION:  CT ABDOMEN PELVIS WITH CONTRAST; CTA CHEST NON CORONARY (PE STUDIES)    CLINICAL HISTORY:  GI bleed;; Pulmonary embolism (PE) suspected, positive D-dimer;    TECHNIQUE:  Axial CTA of the chest was initially performed with 100 cc Omnipaque 350 IV contrast per CTA PE protocol.  Subsequent 1 minute delayed portal venous phase axial CT images of the abdomen and pelvis were also obtained from the lung bases through the ischial tuberosities, per routine protocol.  Coronal and sagittal reconstructions and axial MIPS were generated.    COMPARISON:  Chest radiograph same day, CT renal stone study 11/14/2012    FINDINGS:  Beam hardening with streak artifact from overlying monitoring leads and contrast bolus within the SVC and left subclavian vein with  respiratory motion somewhat limits evaluation.  Timing of contrast bolus is also suboptimal with similar opacification in the pulmonary arterial and venous and also systemic circulations.    CHEST:    Soft tissue structures of the base of the neck are unremarkable.    The esophagus maintains a normal course and caliber. There is no axillary, mediastinal, or hilar lymphadenopathy.    Lower trachea is slightly deviated towards the right secondary to the arch but remains patent.  The proximal airways are patent and the lungs are well expanded.  Mild dependent atelectasis.  Few scattered linear opacities consistent with minimal platelike scarring versus atelectasis.  Minimal biapical pleuroparenchymal scarring with minimal paraseptal emphysema at the lung apices, slightly more so on the right.  There is a 3 mm solid pulmonary nodule within the right lung apex.  No consolidation, pleural effusion, pneumothorax, pleural thickening or pleural calcifications.    The heart is upper limits normal in size and there is no evidence of pericardial effusion.  No significant coronary arterial calcifications seen.  No cardiac thrombus seen.    VASCULAR:    Three vessel left-sided arch.  The thoracic aorta is slightly tortuous.  Minimal calcific atherosclerosis at the aortic arch.  There is no evidence of aortic dissection, aneurysm, or stenosis.  Minimal scattered calcific atherosclerosis of the abdominal aorta which is slightly tortuous.  Portal vasculature is patent.    The pulmonary arteries distribute normally.  Pulmonary trunk is within normal limits.  There are 4 main pulmonary veins draining to the left atrium.  No saddle embolus or central PE.  There is no convincing evidence of intraluminal filling defect the level of the proximal segmental arteries bilaterally to suggest pulmonary thromboembolism.    ABDOMEN/PELVIS:    The liver is enlarged measuring 20.8 cm in length with diffuse parenchymal hypoattenuation and peripheral  sparing.  No focal hepatic abnormality.  There is no intra-or extrahepatic biliary ductal dilatation. The gallbladder, stomach, spleen, pancreas, and adrenal glands are unremarkable.    The kidneys are normal in size and location with symmetric normal enhancement.  No hydronephrosis.  The ureters appear normal in course and caliber without evidence of ureteral dilatation. Urinary bladder is mildly distended with mild circumferential wall thickening and subtle perivesicular stranding.  Prostate and seminal vesicles are within normal limits.  Few scattered punctate pelvic phleboliths noted.    Appendix and terminal ileum are within normal limits.  Several scattered colonic diverticula without convincing evidence of acute diverticulitis.  Mural fat deposition of the cecum and majority of the ascending colon, likely sequela of remote infectious or inflammatory process or chronic laxative use.  The visualized loops of small and large bowel show no evidence of obstruction or inflammation.    There is no ascites, free fluid, or intraperitoneal free air. There is no evidence of lymph node enlargement in the abdomen or pelvis.    Osseous structures show minimal degenerative change and chronic appearing minimal anterior wedge deformity of a few lower thoracic and upper lumbar vertebral bodies without acute or destructive process seen.  Tiny fat containing umbilical hernia and tiny fat containing left inguinal hernia.                               CT Abdomen Pelvis With Contrast (Final result)  Result time 04/07/23 13:11:33    Final result by Shon Jones MD (04/07/23 13:11:33)                 Impression:      1. Technically limited study as above.  No acute cardiopulmonary process.  Specifically, no central PE or evidence of pulmonary thromboembolism through the proximal segmental levels.  Cannot exclude distal segmental or subsegmental pulmonary thromboemboli.  Further evaluation/follow-up as warranted.  2. Urinary  bladder mild circumferential wall thickening with subtle perivesicular stranding which may be related to nonspecific cystitis.  Clinical correlation and with urinalysis advised.  3. Mild colonic diverticulosis without diverticulitis.  4. Hepatomegaly and suspected hepatic steatosis.  5. Minimal systemic atherosclerosis.  No evidence of aortic aneurysm or dissection.  6. Right lung apex 3 mm solid pulmonary nodule.  For a solid nodule <6 mm, Fleischner Society 2017 guidelines recommend no routine follow up for a low risk patient, or follow-up with non-contrast chest CT at 12 months in a high risk patient.  7. Few additional findings as above.      Electronically signed by: Shon Jones MD  Date:    04/07/2023  Time:    13:11             Narrative:    EXAMINATION:  CT ABDOMEN PELVIS WITH CONTRAST; CTA CHEST NON CORONARY (PE STUDIES)    CLINICAL HISTORY:  GI bleed;; Pulmonary embolism (PE) suspected, positive D-dimer;    TECHNIQUE:  Axial CTA of the chest was initially performed with 100 cc Omnipaque 350 IV contrast per CTA PE protocol.  Subsequent 1 minute delayed portal venous phase axial CT images of the abdomen and pelvis were also obtained from the lung bases through the ischial tuberosities, per routine protocol.  Coronal and sagittal reconstructions and axial MIPS were generated.    COMPARISON:  Chest radiograph same day, CT renal stone study 11/14/2012    FINDINGS:  Beam hardening with streak artifact from overlying monitoring leads and contrast bolus within the SVC and left subclavian vein with respiratory motion somewhat limits evaluation.  Timing of contrast bolus is also suboptimal with similar opacification in the pulmonary arterial and venous and also systemic circulations.    CHEST:    Soft tissue structures of the base of the neck are unremarkable.    The esophagus maintains a normal course and caliber. There is no axillary, mediastinal, or hilar lymphadenopathy.    Lower trachea is slightly deviated  towards the right secondary to the arch but remains patent.  The proximal airways are patent and the lungs are well expanded.  Mild dependent atelectasis.  Few scattered linear opacities consistent with minimal platelike scarring versus atelectasis.  Minimal biapical pleuroparenchymal scarring with minimal paraseptal emphysema at the lung apices, slightly more so on the right.  There is a 3 mm solid pulmonary nodule within the right lung apex.  No consolidation, pleural effusion, pneumothorax, pleural thickening or pleural calcifications.    The heart is upper limits normal in size and there is no evidence of pericardial effusion.  No significant coronary arterial calcifications seen.  No cardiac thrombus seen.    VASCULAR:    Three vessel left-sided arch.  The thoracic aorta is slightly tortuous.  Minimal calcific atherosclerosis at the aortic arch.  There is no evidence of aortic dissection, aneurysm, or stenosis.  Minimal scattered calcific atherosclerosis of the abdominal aorta which is slightly tortuous.  Portal vasculature is patent.    The pulmonary arteries distribute normally.  Pulmonary trunk is within normal limits.  There are 4 main pulmonary veins draining to the left atrium.  No saddle embolus or central PE.  There is no convincing evidence of intraluminal filling defect the level of the proximal segmental arteries bilaterally to suggest pulmonary thromboembolism.    ABDOMEN/PELVIS:    The liver is enlarged measuring 20.8 cm in length with diffuse parenchymal hypoattenuation and peripheral sparing.  No focal hepatic abnormality.  There is no intra-or extrahepatic biliary ductal dilatation. The gallbladder, stomach, spleen, pancreas, and adrenal glands are unremarkable.    The kidneys are normal in size and location with symmetric normal enhancement.  No hydronephrosis.  The ureters appear normal in course and caliber without evidence of ureteral dilatation. Urinary bladder is mildly distended with mild  circumferential wall thickening and subtle perivesicular stranding.  Prostate and seminal vesicles are within normal limits.  Few scattered punctate pelvic phleboliths noted.    Appendix and terminal ileum are within normal limits.  Several scattered colonic diverticula without convincing evidence of acute diverticulitis.  Mural fat deposition of the cecum and majority of the ascending colon, likely sequela of remote infectious or inflammatory process or chronic laxative use.  The visualized loops of small and large bowel show no evidence of obstruction or inflammation.    There is no ascites, free fluid, or intraperitoneal free air. There is no evidence of lymph node enlargement in the abdomen or pelvis.    Osseous structures show minimal degenerative change and chronic appearing minimal anterior wedge deformity of a few lower thoracic and upper lumbar vertebral bodies without acute or destructive process seen.  Tiny fat containing umbilical hernia and tiny fat containing left inguinal hernia.                               X-Ray Chest AP Portable (Final result)  Result time 04/07/23 08:00:20    Final result by Cristhian Sanford MD (04/07/23 08:00:20)                 Impression:      No convincing evidence of acute cardiopulmonary disease.      Electronically signed by: Cristhian Sanford  Date:    04/07/2023  Time:    08:00             Narrative:    EXAMINATION:  XR CHEST AP PORTABLE    CLINICAL HISTORY:  Hypoglycemia, unspecified    TECHNIQUE:  Single frontal view of the chest was performed.    COMPARISON:  Chest radiograph performed 05/03/2016.    FINDINGS:  Monitoring leads over the chest.  Cardiomediastinal contour appears to be within normal limits.    Lungs appear essentially clear.    No definite pneumothorax or large volume pleural effusion.    No acute findings in the visualized abdomen.    Osseous and soft tissue structures appear without definite acute change.                                  Pending  Diagnostic Studies:     None         Medications:  Reconciled Home Medications:      Medication List      START taking these medications    diazePAM 5 MG tablet  Commonly known as: VALIUM  Take 1 tablet (5 mg total) by mouth every 12 (twelve) hours for 1 day, THEN 1 tablet (5 mg total) once daily for 1 day.  Start taking on: April 9, 2023     folic acid 1 MG tablet  Commonly known as: FOLVITE  Take 1 tablet (1 mg total) by mouth once daily.     pantoprazole 40 MG tablet  Commonly known as: PROTONIX  Take 1 tablet (40 mg total) by mouth once daily.     thiamine 100 MG tablet  Take 1 tablet (100 mg total) by mouth once daily.        STOP taking these medications    amLODIPine 10 MG tablet  Commonly known as: NORVASC     atorvastatin 20 MG tablet  Commonly known as: LIPITOR     losartan-hydrochlorothiazide 100-25 mg 100-25 mg per tablet  Commonly known as: HYZAAR            Indwelling Lines/Drains at time of discharge:   Lines/Drains/Airways     None                 Time spent on the discharge of patient: Greater than 35 minutes         Kath Way DO  Department of Hospital Medicine  [facility]

## 2023-04-14 ENCOUNTER — TELEPHONE (OUTPATIENT)
Dept: HEPATOLOGY | Facility: CLINIC | Age: 58
End: 2023-04-14
Payer: MEDICAID

## 2023-04-14 NOTE — TELEPHONE ENCOUNTER
Referral to hepatology for elevated liver enzymes, fatty liver, concern for alcohol related liver disease     Scheduling attempt # 1    Please call pt to schedule appt with any [facility]  Encourage video visits for new patient appts with APPs

## 2023-04-19 ENCOUNTER — ANESTHESIA EVENT (OUTPATIENT)
Dept: ENDOSCOPY | Facility: HOSPITAL | Age: 58
End: 2023-04-19
Payer: MEDICAID

## 2023-04-19 RX ORDER — SODIUM CHLORIDE 9 MG/ML
INJECTION, SOLUTION INTRAVENOUS CONTINUOUS
Status: CANCELLED | OUTPATIENT
Start: 2023-04-19

## 2023-04-20 ENCOUNTER — ANESTHESIA (OUTPATIENT)
Dept: ENDOSCOPY | Facility: HOSPITAL | Age: 58
End: 2023-04-20
Payer: MEDICAID

## 2023-04-20 ENCOUNTER — HOSPITAL ENCOUNTER (OUTPATIENT)
Facility: HOSPITAL | Age: 58
Discharge: HOME | End: 2023-04-20
Attending: STUDENT IN AN ORGANIZED HEALTH CARE EDUCATION/TRAINING PROGRAM | Admitting: STUDENT IN AN ORGANIZED HEALTH CARE EDUCATION/TRAINING PROGRAM
Payer: MEDICAID

## 2023-04-20 VITALS
OXYGEN SATURATION: 100 % | RESPIRATION RATE: 19 BRPM | TEMPERATURE: 98 F | SYSTOLIC BLOOD PRESSURE: 166 MMHG | HEART RATE: 62 BPM | DIASTOLIC BLOOD PRESSURE: 103 MMHG

## 2023-04-20 DIAGNOSIS — K92.0 COFFEE GROUND EMESIS: ICD-10-CM

## 2023-04-20 PROCEDURE — D9220A PRA ANESTHESIA: Mod: CRNA,,, | Performed by: NURSE ANESTHETIST, CERTIFIED REGISTERED

## 2023-04-20 PROCEDURE — D9220A PRA ANESTHESIA: ICD-10-PCS | Mod: ANES,,, | Performed by: ANESTHESIOLOGY

## 2023-04-20 PROCEDURE — 43235 PR EGD, FLEX, DIAGNOSTIC: ICD-10-PCS | Mod: ,,, | Performed by: STUDENT IN AN ORGANIZED HEALTH CARE EDUCATION/TRAINING PROGRAM

## 2023-04-20 PROCEDURE — 43235 EGD DIAGNOSTIC BRUSH WASH: CPT | Mod: ,,, | Performed by: STUDENT IN AN ORGANIZED HEALTH CARE EDUCATION/TRAINING PROGRAM

## 2023-04-20 PROCEDURE — 00731 ANES UPR GI NDSC PX NOS: CPT | Performed by: STUDENT IN AN ORGANIZED HEALTH CARE EDUCATION/TRAINING PROGRAM

## 2023-04-20 PROCEDURE — D9220A PRA ANESTHESIA: ICD-10-PCS | Mod: CRNA,,, | Performed by: NURSE ANESTHETIST, CERTIFIED REGISTERED

## 2023-04-20 PROCEDURE — 25000003 PHARM REV CODE 250: Performed by: NURSE ANESTHETIST, CERTIFIED REGISTERED

## 2023-04-20 PROCEDURE — 63600175 PHARM REV CODE 636 W HCPCS: Performed by: NURSE ANESTHETIST, CERTIFIED REGISTERED

## 2023-04-20 PROCEDURE — 43235 EGD DIAGNOSTIC BRUSH WASH: CPT | Performed by: STUDENT IN AN ORGANIZED HEALTH CARE EDUCATION/TRAINING PROGRAM

## 2023-04-20 PROCEDURE — 37000009 HC ANESTHESIA EA ADD 15 MINS: Performed by: STUDENT IN AN ORGANIZED HEALTH CARE EDUCATION/TRAINING PROGRAM

## 2023-04-20 PROCEDURE — 25000003 PHARM REV CODE 250: Performed by: STUDENT IN AN ORGANIZED HEALTH CARE EDUCATION/TRAINING PROGRAM

## 2023-04-20 PROCEDURE — 37000008 HC ANESTHESIA 1ST 15 MINUTES: Performed by: STUDENT IN AN ORGANIZED HEALTH CARE EDUCATION/TRAINING PROGRAM

## 2023-04-20 PROCEDURE — D9220A PRA ANESTHESIA: Mod: ANES,,, | Performed by: ANESTHESIOLOGY

## 2023-04-20 RX ORDER — LIDOCAINE HYDROCHLORIDE 20 MG/ML
INJECTION, SOLUTION EPIDURAL; INFILTRATION; INTRACAUDAL; PERINEURAL
Status: DISCONTINUED
Start: 2023-04-20 | End: 2023-04-20 | Stop reason: HOSPADM

## 2023-04-20 RX ORDER — LIDOCAINE HYDROCHLORIDE 20 MG/ML
INJECTION INTRAVENOUS
Status: DISCONTINUED | OUTPATIENT
Start: 2023-04-20 | End: 2023-04-20

## 2023-04-20 RX ORDER — PROPOFOL 10 MG/ML
VIAL (ML) INTRAVENOUS
Status: DISCONTINUED | OUTPATIENT
Start: 2023-04-20 | End: 2023-04-20

## 2023-04-20 RX ORDER — SODIUM CHLORIDE 9 MG/ML
INJECTION, SOLUTION INTRAVENOUS CONTINUOUS
Status: DISCONTINUED | OUTPATIENT
Start: 2023-04-20 | End: 2023-04-20 | Stop reason: HOSPADM

## 2023-04-20 RX ORDER — PROPOFOL 10 MG/ML
INJECTION, EMULSION INTRAVENOUS
Status: DISCONTINUED
Start: 2023-04-20 | End: 2023-04-20 | Stop reason: HOSPADM

## 2023-04-20 RX ADMIN — PROPOFOL 100 MG: 10 INJECTION, EMULSION INTRAVENOUS at 09:04

## 2023-04-20 RX ADMIN — LIDOCAINE HYDROCHLORIDE 100 MG: 20 INJECTION, SOLUTION INTRAVENOUS at 09:04

## 2023-04-20 RX ADMIN — PROPOFOL 130 MG: 10 INJECTION, EMULSION INTRAVENOUS at 09:04

## 2023-04-20 RX ADMIN — SODIUM CHLORIDE: 0.9 INJECTION, SOLUTION INTRAVENOUS at 09:04

## 2023-04-20 NOTE — PLAN OF CARE
The Dr spoke with the patient regarding his procedure findings. The patient verbalized understanding the conversation. The patient denies any pain or discomfort.

## 2023-04-20 NOTE — TRANSFER OF CARE
Anesthesia Transfer of Care Note    Patient: Idalia Ahn    Procedure(s) Performed: Procedure(s) (LRB):  EGD (ESOPHAGOGASTRODUODENOSCOPY) (N/A)    Patient location: GI    Anesthesia Type: general    Transport from OR: Transported from OR on room air with adequate spontaneous ventilation    Post pain: adequate analgesia    Post assessment: no apparent anesthetic complications    Post vital signs: stable    Level of consciousness: sedated    Nausea/Vomiting: no nausea/vomiting    Complications: none    Transfer of care protocol was followed      Last vitals:   Visit Vitals  /85 (BP Location: Left arm, Patient Position: Lying)   Pulse 76   Temp 36.5 °C (97.7 °F) (Oral)   Resp 16   SpO2 (!) 93%

## 2023-04-20 NOTE — ANESTHESIA PREPROCEDURE EVALUATION
04/20/2023  Idalia Ahn is a 57 y.o., male.  To undergo Procedure(s) (LRB):  EGD (ESOPHAGOGASTRODUODENOSCOPY) (N/A)     Denies CP/SOB/GERD/MI/CVA/URI symptoms.  METS > 4  NPO > 8    Past Medical History:  Past Medical History:   Diagnosis Date    Alcohol abuse, daily use     Hypertension        Past Surgical History:  Past Surgical History:   Procedure Laterality Date    LEG SURGERY Left     POSTERIOR KNEE CYST REMOVAL       Social History:  Social History     Socioeconomic History    Marital status:    Tobacco Use    Smoking status: Some Days     Types: Cigarettes    Smokeless tobacco: Never   Substance and Sexual Activity    Alcohol use: Yes     Comment: DAILY LIQUOR/GIN USE    Drug use: No       Medications:  No current facility-administered medications on file prior to encounter.     Current Outpatient Medications on File Prior to Encounter   Medication Sig Dispense Refill    diazePAM (VALIUM) 5 MG tablet Take 1 tablet (5 mg total) by mouth every 12 (twelve) hours for 1 day, THEN 1 tablet (5 mg total) once daily for 1 day. 3 tablet 0    folic acid (FOLVITE) 1 MG tablet Take 1 tablet (1 mg total) by mouth once daily. 30 tablet 0    pantoprazole (PROTONIX) 40 MG tablet Take 1 tablet (40 mg total) by mouth once daily. 30 tablet 1    thiamine 100 MG tablet Take 1 tablet (100 mg total) by mouth once daily. 30 tablet 0       Allergies:  Review of patient's allergies indicates:  No Known Allergies    Active Problems:  Patient Active Problem List   Diagnosis    Hypoglycemia    High anion gap metabolic acidosis    Alcohol use    Upper GI bleed    Alcoholic hepatitis without ascites    Leukocytosis    Elevated troponin       Diagnostic Studies:   Latest Reference Range & Units 04/09/23 04:27   WBC 3.90 - 12.70 K/uL 3.78 (L)   RBC 4.60 - 6.20 M/uL 3.91 (L)   Hemoglobin 14.0 - 18.0 g/dL  12.8 (L)   Hematocrit 40.0 - 54.0 % 38.0 (L)   MCV 82 - 98 fL 97   MCH 27.0 - 31.0 pg 32.7 (H)   MCHC 32.0 - 36.0 g/dL 33.7   RDW 11.5 - 14.5 % 15.7 (H)   Platelets 150 - 450 K/uL 137 (L)   MPV 9.2 - 12.9 fL 9.4   Gran % 38.0 - 73.0 % 58.4   Lymph % 18.0 - 48.0 % 20.6   Mono % 4.0 - 15.0 % 17.5 (H)   Eosinophil % 0.0 - 8.0 % 1.9   Basophil % 0.0 - 1.9 % 1.1   Immature Granulocytes 0.0 - 0.5 % 0.5   Gran # (ANC) 1.8 - 7.7 K/uL 2.2   Lymph # 1.0 - 4.8 K/uL 0.8 (L)   Mono # 0.3 - 1.0 K/uL 0.7   Eos # 0.0 - 0.5 K/uL 0.1   Baso # 0.00 - 0.20 K/uL 0.04   Immature Grans (Abs) 0.00 - 0.04 K/uL 0.02   nRBC 0 /100 WBC 3 !   Differential Method  Automated      Latest Reference Range & Units 04/09/23 04:27   Sodium 136 - 145 mmol/L 141   Potassium 3.5 - 5.1 mmol/L 3.6   Chloride 95 - 110 mmol/L 104   CO2 23 - 29 mmol/L 28   Anion Gap 8 - 16 mmol/L 9   BUN 6 - 20 mg/dL 14   Creatinine 0.5 - 1.4 mg/dL 1.0   eGFR >60 mL/min/1.73 m^2 >60   Glucose 70 - 110 mg/dL 148 (H)   Calcium 8.7 - 10.5 mg/dL 8.3 (L)   Phosphorus 2.7 - 4.5 mg/dL 1.8 (L)   Magnesium 1.6 - 2.6 mg/dL 1.6   Alkaline Phosphatase 55 - 135 U/L 36 (L)   PROTEIN TOTAL 6.0 - 8.4 g/dL 6.1   Albumin 3.5 - 5.2 g/dL 3.2 (L)   BILIRUBIN TOTAL 0.1 - 1.0 mg/dL 0.8   AST 10 - 40 U/L 151 (H)   ALT 10 - 44 U/L 83 (H)     EKG (4/7/23):  Sinus tachycardia   Nonspecific T wave abnormality     24 Hour Vitals:      See Nursing Charting For Additional Vitals      Pre-op Assessment    I have reviewed the Patient Summary Reports.     I have reviewed the Nursing Notes.       Review of Systems  Anesthesia Hx:  No problems with previous Anesthesia   Denies Personal Hx of Anesthesia complications.   Social:  Smoker, Alcohol Use Extensive EtOH use   Hematology/Oncology:         -- Anemia: Hematology Comments: Thrombocytopenia   Cardiovascular:   Exercise tolerance: good Hypertension ECG has been reviewed.    Pulmonary:  Pulmonary Normal    Hepatic/GI:   Hepatitis    Neurological:  Neurology  Normal    Endocrine:  Endocrine Normal        Physical Exam  General: Well nourished and Cooperative    Airway:  Mallampati: II   Mouth Opening: Normal  TM Distance: Normal      Dental:  Intact    Chest/Lungs:  Clear to auscultation, Normal Respiratory Rate    Heart:  Rate: Normal  Rhythm: Regular Rhythm        Anesthesia Plan  Type of Anesthesia, risks & benefits discussed:    Anesthesia Type: Gen Natural Airway, MAC, Gen ETT  Intra-op Monitoring Plan: Standard ASA Monitors  Post Op Pain Control Plan: multimodal analgesia and IV/PO Opioids PRN  Induction:  IV  Informed Consent: Informed consent signed with the Patient and all parties understand the risks and agree with anesthesia plan.  All questions answered.   ASA Score: 2    Ready For Surgery From Anesthesia Perspective.     .

## 2023-04-20 NOTE — PROVATION PATIENT INSTRUCTIONS
Discharge Summary/Instructions after an Endoscopic Procedure  Patient Name: Idalia Ahn  Patient MRN: [id number]  Patient YOB: 1965  Thursday, April 20, 2023  Levi Kidd MD  Dear patient,  As a result of recent federal legislation (The Federal Cures Act), you may   receive lab or pathology results from your procedure in your [facility]   account before your physician is able to contact you. Your physician or   their representative will relay the results to you with their   recommendations at their soonest availability.  Thank you,  RESTRICTIONS:  During your procedure today, you received medications for sedation.  These   medications may affect your judgment, balance and coordination.  Therefore,   for 24 hours, you have the following restrictions:   - DO NOT drive a car, operate machinery, make legal/financial decisions,   sign important papers or drink alcohol.    ACTIVITY:  Today: no heavy lifting, straining or running due to procedural   sedation/anesthesia.  The following day: return to full activity including work.  DIET:  Eat and drink normally unless instructed otherwise.     TREATMENT FOR COMMON SIDE EFFECTS:  - Mild abdominal pain, nausea, belching, bloating or excessive gas:  rest,   eat lightly and use a heating pad.  - Sore Throat: treat with throat lozenges and/or gargle with warm salt   water.  - Because air was used during the procedure, expelling large amounts of air   from your rectum or belching is normal.  - If a bowel prep was taken, you may not have a bowel movement for 1-3 days.    This is normal.  SYMPTOMS TO WATCH FOR AND REPORT TO YOUR PHYSICIAN:  1. Abdominal pain or bloating, other than gas cramps.  2. Chest pain.  3. Back pain.  4. Signs of infection such as: chills or fever occurring within 24 hours   after the procedure.  5. Rectal bleeding, which would show as bright red, maroon, or black stools.   (A tablespoon of blood from the rectum is not serious, especially  Postpartum day 2.    Patient reports:  Doing well.  Tolerating general diet.  No N&V.  Pain is well controlled on oral medication.  Bleeding minimal.  Denies SOB and CP with ambulation.  Patient is breastfeeding without difficulty.      Visit Vitals  /70 (BP Location: ANA MARIA, Patient Position: Sitting)   Pulse 80   Temp 97.6 °F (36.4 °C) (Oral)   Resp 17   Ht 5' 6\" (1.676 m)   Wt 90.7 kg   LMP 12/10/2018 (Approximate)   SpO2 98%   Breastfeeding? Yes   BMI 32.26 kg/m²       Uterine fundus firm, nontender.    Extremities:  Nontender, minimal edema.    HGB (g/dL)   Date Value   09/07/2019 10.9 (L)      HCT (%)   Date Value   09/07/2019 33.2 (L)        Impression:  Doing well.    Plan:  Routine postpartum care, discharge to home, F/U with CNM in 6 weeks.   if   hemorrhoids are present.)  6. Vomiting.  7. Weakness or dizziness.  GO DIRECTLY TO THE NEAREST EMERGENCY ROOM IF YOU HAVE ANY OF THE FOLLOWING:      Difficulty breathing              Chills and/or fever over 101 F   Persistent vomiting and/or vomiting blood   Severe abdominal pain   Severe chest pain   Black, tarry stools   Bleeding- more than one tablespoon   Any other symptom or condition that you feel may need urgent attention  Your doctor recommends these additional instructions:  If any biopsies were taken, your doctors clinic will contact you in 1 to 2   weeks with any results.  - Discharge patient to home.   - The findings and recommendations were discussed with the patient.   - Patient has a contact number available for emergencies.  The signs and   symptoms of potential delayed complications were discussed with the   patient.  Return to normal activities tomorrow.  Written discharge   instructions were provided to the patient.  For questions, problems or results please call your physician - Levi Kidd MD at Work:  [phone].  [facility] can be reached at [phone]     IF A COMPLICATION OR EMERGENCY SITUATION ARISES AND YOU ARE UNABLE TO REACH   YOUR PHYSICIAN - GO DIRECTLY TO THE EMERGENCY ROOM.  Levi Kidd MD  4/20/2023 9:54:30 AM  This report has been verified and signed electronically.  Dear patient,  As a result of recent federal legislation (The Federal Cures Act), you may   receive lab or pathology results from your procedure in your [facility]   account before your physician is able to contact you. Your physician or   their representative will relay the results to you with their   recommendations at their soonest availability.  Thank you,  PROVATION

## 2023-04-20 NOTE — ANESTHESIA POSTPROCEDURE EVALUATION
Anesthesia Post Evaluation    Patient: Idalia Ahn    Procedure(s) Performed: Procedure(s) (LRB):  EGD (ESOPHAGOGASTRODUODENOSCOPY) (N/A)    Final Anesthesia Type: general      Patient location during evaluation: GI PACU  Patient participation: Yes- Able to Participate  Level of consciousness: awake and alert and oriented  Post-procedure vital signs: reviewed and stable  Pain management: adequate  Airway patency: patent    PONV status at discharge: No PONV  Anesthetic complications: no      Cardiovascular status: hemodynamically stable and blood pressure returned to baseline  Respiratory status: spontaneous ventilation, room air and unassisted  Hydration status: euvolemic  Follow-up not needed.          Vitals Value Taken Time   /103 04/20/23 1025   Temp 36.5 °C (97.7 °F) 04/20/23 1010   Pulse 62 04/20/23 1025   Resp 19 04/20/23 1025   SpO2 100 % 04/20/23 1025         Event Time   Out of Recovery 10:30:00         Pain/Gabriel Score: Gabriel Score: 8 (4/20/2023 10:26 AM)

## 2023-04-20 NOTE — OR NURSING
The Dr spoke with the patient regarding his procedure findings. The patient verbalized understanding the conversation. The patient denies any pain or discomfort

## 2023-04-20 NOTE — H&P
Short Stay Endoscopy History and Physical    PCP - [facility]  Referring Physician - Magalis Pina RN  No address on file    Procedure - EGD  ASA - per anesthesia  Mallampati - per anesthesia  History of Anesthesia problems - no  Family history Anesthesia problems -  no   Plan of anesthesia - General    HPI  57 y.o. male  Reason for procedure:  Coffee ground emesis [K92.0]      ROS:  Constitutional: No fevers, chills, No weight loss  CV: No chest pain  Pulm: No cough, No shortness of breath  GI: see HPI    Medical History:  has a past medical history of Alcohol abuse, daily use and Hypertension.    Surgical History:  has a past surgical history that includes Leg Surgery (Left).    Family History: family history includes Vision loss in his father..    Social History:  reports that he has been smoking cigarettes. He has never used smokeless tobacco. He reports current alcohol use. He reports that he does not use drugs.    Review of patient's allergies indicates:  No Known Allergies    Medications:   Medications Prior to Admission   Medication Sig Dispense Refill Last Dose    pantoprazole (PROTONIX) 40 MG tablet Take 1 tablet (40 mg total) by mouth once daily. 30 tablet 1 4/19/2023    diazePAM (VALIUM) 5 MG tablet Take 1 tablet (5 mg total) by mouth every 12 (twelve) hours for 1 day, THEN 1 tablet (5 mg total) once daily for 1 day. 3 tablet 0     folic acid (FOLVITE) 1 MG tablet Take 1 tablet (1 mg total) by mouth once daily. 30 tablet 0     thiamine 100 MG tablet Take 1 tablet (100 mg total) by mouth once daily. 30 tablet 0        Physical Exam:    Vital Signs: There were no vitals filed for this visit.    General Appearance: Well appearing in no acute distress  Abdomen: Soft, non tender, non distended with normal bowel sounds, no masses      Labs:  Lab Results   Component Value Date    WBC 3.78 (L) 04/09/2023    HGB 12.8 (L) 04/09/2023    HCT 38.0 (L) 04/09/2023     (L) 04/09/2023    ALT 83 (H)  04/09/2023     (H) 04/09/2023     04/09/2023    K 3.6 04/09/2023     04/09/2023    CREATININE 1.0 04/09/2023    BUN 14 04/09/2023    CO2 28 04/09/2023    INR 1.3 (H) 04/07/2023       I have explained the risks and benefits of this endoscopic procedure to the patient including but not limited to bleeding, inflammation, infection, perforation, and death.      Levi Kidd MD

## 2023-05-16 NOTE — TELEPHONE ENCOUNTER
See message below, please try 3rd attempt, if unsuccessful let me know and I can mail her a letter    Thanks !

## 2023-05-18 NOTE — TELEPHONE ENCOUNTER
Called pt in attempt to make apt. Vm was not able to be left due to vm box being full. SMS was sent with call back number.    Scheduling attempt #3

## 2023-07-10 PROBLEM — K92.2 UPPER GI BLEED: Status: RESOLVED | Noted: 2023-04-07 | Resolved: 2023-07-10

## 2025-06-28 ENCOUNTER — HOSPITAL ENCOUNTER (EMERGENCY)
Facility: HOSPITAL | Age: 60
Discharge: HOME | End: 2025-06-28
Attending: EMERGENCY MEDICINE
Payer: MEDICAID

## 2025-06-28 VITALS
TEMPERATURE: 99 F | RESPIRATION RATE: 18 BRPM | HEART RATE: 92 BPM | SYSTOLIC BLOOD PRESSURE: 168 MMHG | WEIGHT: 170 LBS | OXYGEN SATURATION: 99 % | DIASTOLIC BLOOD PRESSURE: 102 MMHG | BODY MASS INDEX: 25.18 KG/M2 | HEIGHT: 69 IN

## 2025-06-28 DIAGNOSIS — M79.673 FOOT PAIN: ICD-10-CM

## 2025-06-28 DIAGNOSIS — M72.2 PLANTAR FASCIITIS: Primary | ICD-10-CM

## 2025-06-28 DIAGNOSIS — M79.671 FOOT PAIN, RIGHT: ICD-10-CM

## 2025-06-28 DIAGNOSIS — M79.672 LEFT FOOT PAIN: ICD-10-CM

## 2025-06-28 LAB
GLUCOSE SERPL-MCNC: 120 MG/DL (ref 70–110)
POCT GLUCOSE: 120 MG/DL (ref 70–110)

## 2025-06-28 PROCEDURE — 82962 GLUCOSE BLOOD TEST: CPT

## 2025-06-28 PROCEDURE — 99283 EMERGENCY DEPT VISIT LOW MDM: CPT | Mod: 25

## 2025-06-28 RX ORDER — LOSARTAN POTASSIUM 50 MG/1
50 TABLET ORAL DAILY
COMMUNITY
Start: 2024-12-31

## 2025-06-28 RX ORDER — IBUPROFEN 600 MG/1
600 TABLET, FILM COATED ORAL EVERY 6 HOURS PRN
Qty: 20 TABLET | Refills: 0 | Status: SHIPPED | OUTPATIENT
Start: 2025-06-28

## 2025-06-28 RX ORDER — AMLODIPINE BESYLATE 10 MG/1
10 TABLET ORAL DAILY
COMMUNITY
Start: 2025-04-26

## 2025-06-28 RX ORDER — ATORVASTATIN CALCIUM 20 MG/1
20 TABLET, FILM COATED ORAL NIGHTLY
COMMUNITY
Start: 2025-04-26

## 2025-06-28 NOTE — ED TRIAGE NOTES
Patient reports left foot cramping and burning that he states started 4 days ago, denies any injury or trauma to the foot. Denies any fever, cough or chest pain.

## 2025-06-28 NOTE — DISCHARGE INSTRUCTIONS
Apply a compressive ACE bandage. Rest and elevate the affected painful area.  Apply cold compresses intermittently as needed.  As pain recedes, begin normal activities slowly as tolerated.  Call if symptoms persist.  Thank you for coming to our Emergency Department today. It is important to remember that some problems or medical conditions are difficult to diagnose and may not be found or addressed during your Emergency Department visit.  These conditions often start with non-specific symptoms and can only be diagnosed on follow up visits with your primary care physician or specialist when the symptoms continue or change. Please remember that all medical conditions can change, and we cannot predict how you will be feeling tomorrow or the next day. Return to the ER with any questions/concerns, new/concerning symptoms including fever, chest pain, shortness of breath, loss of consciousness, dizziness, weakness, worsening symptoms, failure to improve, or any other concerns. Also, please follow up with your Primary Care Physician and/or Pediatrician in the next 1-2 days to review your ED visit in entirety and for re-evaluation.   Be sure to follow up with your primary care doctor and review all labs/imaging/tests that were performed during your ER visit with them. It is very common for us to identify non-emergent incidental findings which must be followed up with your primary care physician.  Some labs/imaging/tests may be outside of the normal range, and require non-emergent follow-up and/or further investigation/treatment/procedures/testing to help diagnose/exclude/prevent complications or other potentially serious medical conditions. Some abnormalities may not have been discussed or addressed during your ER visit. Some lab results may not return during your ER visit but can be accessible by downloading the free [facility] aung or by visiting [email or web address] . It is important for you to review all  labs/imaging/tests which are outside of the normal range with your physician.  An ER visit does not replace a primary care visit, and many screening tests or follow-up tests cannot be ordered by an ER doctor or performed by the ER. Some tests may even require pre-approval.  If you do not have a primary care doctor, you may contact the one listed on your discharge paperwork or you may also call the [facility] Appointment Desk at [phone] , or [address] [place] [address] at  [phone] to schedule an appointment, or establish care with a primary care doctor or even a specialist and to obtain information about local resources. It is important to your health that you have a primary care doctor.  Please take all medications as directed. We have done our best to select a medication for you that will treat your condition however, all medications may potentially have side-effects and it is impossible to predict which medications may give you side-effects or what those side-effects (if any) those medications may give you.  If you feel that you are having a negative effect or side-effect of any medication you should stop taking those medications immediately and seek medical attention. If you feel that you are having a life-threatening reaction call 911.  Do not drive, swim, climb to height, take a bath, operate heavy machinery, drink alcohol or take potentially sedating medications, sign any legal documents or make any important decisions for 24 hours if you have received any pain medications, sedatives or mood altering drugs during your ER visit or within 24 hours of taking them if they have been prescribed to you.   You can find additional resources for Dentists, hearing aids, durable medical equipment, low cost pharmacies and other resources at [email or web address]  Patient agrees with this discharge plan. Discussed strict return precautions, they verbalized understanding.   § Please take all medication as prescribed.

## 2025-06-28 NOTE — ED PROVIDER NOTES
Encounter Date: 6/28/2025       History     Chief Complaint   Patient presents with    Foot Pain     Pt arrived in ED, c/o right foot pain, to bottom of foot,  x 4 days. Pt describes pain as a cramp. Pt denies any recent injuries or trauma. Pt denies having in further complaints.      The history is provided by the patient and medical records.   29-year-old male past medical history of alcohol abuse, hypertension presenting to the emergency department today for evaluation of foot pain the bilateral bottom feet for the past 4 days.  Reports he has been walking a lot more than usual at work with the pain began and reports that has worse with walking better at rest.  States the pain is a cramping sensation.  Also notes some tingling to the left bottom foot with walking.  Denies any trauma or injury.  No medications taken for his symptoms.  Denies any extremity swelling, extremity weakness, fever, other associated symptoms.  Review of patient's allergies indicates:  No Known Allergies  Past Medical History:   Diagnosis Date    Alcohol abuse, daily use     Hypertension      Past Surgical History:   Procedure Laterality Date    ESOPHAGOGASTRODUODENOSCOPY N/A 4/20/2023    Procedure: EGD (ESOPHAGOGASTRODUODENOSCOPY);  Surgeon: Levi Kidd MD;  [facility]: [facility];  Service: Endoscopy;  Laterality: N/A;  Instructions mailed to patient at [address] [place] [address], [address] [address]    LEG SURGERY Left     POSTERIOR KNEE CYST REMOVAL     Family History   Problem Relation Name Age of Onset    Vision loss Father       Social History[1]  Review of Systems   Constitutional:  Negative for chills, diaphoresis, fatigue and fever.   HENT:  Negative for congestion, ear discharge, ear pain, rhinorrhea, sore throat and trouble swallowing.    Eyes:  Negative for photophobia, redness and visual disturbance.   Respiratory:  Negative for cough and shortness of breath.    Cardiovascular:  Negative for chest pain, palpitations  and leg swelling.   Gastrointestinal:  Negative for abdominal pain, diarrhea, nausea and vomiting.   Genitourinary:  Negative for difficulty urinating, dysuria, flank pain, frequency and hematuria.   Musculoskeletal:  Positive for myalgias (Bilateral plantar feet). Negative for arthralgias, back pain, gait problem, joint swelling, neck pain and neck stiffness.   Skin:  Negative for color change, pallor, rash and wound.   Neurological:  Positive for numbness (Bottom of left foot). Negative for dizziness, weakness, light-headedness and headaches.   Hematological:  Does not bruise/bleed easily.       Physical Exam     Initial Vitals [06/28/25 1046]   BP Pulse Resp Temp SpO2   (!) 181/107 92 18 98.6 °F (37 °C) 99 %      MAP       --         Physical Exam    Nursing note and vitals reviewed.  Constitutional: He appears well-developed and well-nourished. He is not diaphoretic. He does not appear ill. No distress.   HENT:   Head: Normocephalic and atraumatic.   Right Ear: External ear normal.   Left Ear: External ear normal.   Nose: Nose normal. Mouth/Throat: Uvula is midline and oropharynx is clear and moist.   Eyes: Conjunctivae and EOM are normal. Pupils are equal, round, and reactive to light. Right eye exhibits no discharge. Left eye exhibits no discharge. No scleral icterus.   Neck: Trachea normal. Neck supple.   Normal range of motion.   Full passive range of motion without pain.     Cardiovascular:  Normal rate, regular rhythm, normal heart sounds, intact distal pulses and normal pulses.     Exam reveals no distant heart sounds and no friction rub.       Pulmonary/Chest: Effort normal and breath sounds normal. No respiratory distress.   Abdominal: Abdomen is soft. Bowel sounds are normal. He exhibits no distension and no pulsatile midline mass. There is no abdominal tenderness.   No right CVA tenderness.  No left CVA tenderness. There is no rebound and no guarding.   Musculoskeletal:         General: Normal range  of motion.      Right shoulder: Normal.      Left shoulder: Normal.      Right elbow: Normal.      Left elbow: Normal.      Right wrist: Normal.      Left wrist: Normal.      Right hand: Normal.      Left hand: Normal.      Cervical back: Normal, full passive range of motion without pain, normal range of motion and neck supple.      Thoracic back: Normal.      Lumbar back: Normal.      Right hip: Normal.      Left hip: Normal.      Right knee: Normal.      Left knee: Normal.      Right lower leg: Normal.      Left lower leg: Normal.      Right ankle: Normal.      Right Achilles Tendon: Normal.      Left ankle: Normal.      Left Achilles Tendon: Normal.      Right foot: Normal range of motion and normal capillary refill. Tenderness (Pinpoint over plantar fascia) present. No swelling, deformity, bunion, Charcot foot, foot drop, prominent metatarsal heads, laceration, bony tenderness or crepitus. Normal pulse.      Left foot: Normal range of motion and normal capillary refill. Tenderness (Pinpoint for plantar fascia) present. No swelling, deformity, bunion, Charcot foot, foot drop, prominent metatarsal heads, laceration, bony tenderness or crepitus. Normal pulse.        Feet:       Comments: Focused exam of bilateral feet and ankles:  Full range of motion with 5/5 strength against resistance.  2+ dorsal pedal and posterior tibial pulses.  Sensation equal bilaterally.  No swelling or erythema.  That has tenderness to the plantar tendon no overlying skin changes.  That has no lower extremity swelling or edema.      Neurological: He is alert and oriented to person, place, and time. He has normal strength. No cranial nerve deficit or sensory deficit. Coordination and gait normal.   Skin: Skin is warm and dry. Capillary refill takes less than 2 seconds. No bruising, no ecchymosis and no rash noted. No erythema.   Psychiatric: He has a normal mood and affect. His speech is normal and behavior is normal. Thought content  normal.         ED Course   Procedures  Labs Reviewed   POCT GLUCOSE MONITORING CONTINUOUS - Abnormal       Result Value    POC Glucose 120 (*)    POCT GLUCOSE - Abnormal    POCT Glucose 120 (*)           Imaging Results              X-Ray Foot Complete Bilateral (Final result)  Result time 06/28/25 13:16:06   Procedure changed from X-Ray Foot Complete Left     Final result by Sabas Merida MD (06/28/25 13:16:06)                   Impression:      No acute abnormality.      Electronically signed by: Sabas Merida MD  Date:    06/28/2025  Time:    13:16               Narrative:    EXAMINATION:  XR FOOT COMPLETE 3 VIEW BILATERAL    CLINICAL HISTORY:  pain; Pain in left foot    TECHNIQUE:  AP, lateral, and oblique views of both feet were performed.    COMPARISON:  None    FINDINGS:  No displaced fracture or subluxation in either foot.  No cortical destruction or bone lysis.    No prominent degenerative change.    Unremarkable soft tissues.  No soft tissue gas.  No unexpected radiopaque foreign body.                                       Medications - No data to display  Medical Decision Making  29-year-old male past medical history of alcohol abuse, hypertension presenting to the emergency department today for evaluation of foot pain the bilateral bottom feet for the past 4 days.  Reports he has been walking a lot more than usual at work with the pain began and reports that has worse with walking better at rest.  States the pain is a cramping sensation.  Also notes some tingling to the left bottom foot with walking.  Denies any trauma or injury.  No medications taken for his symptoms.  Denies any extremity swelling, extremity weakness, fever, other associated symptoms.  Patient's chart and medical history reviewed.  Patient's vitals reviewed.  They are afebrile, no respiratory distress, nontoxic-appearing in the ED.  Differential diagnosis is considered the following.  - Septic Arthritis, Gout, Osteomyelitis:  considered with pain, although unlikely without overlying erythema and swelling/edema, patient is afebrile  - Fracture/Dislocation: considered with pain, imaging ordered for further evaluation.  Per my interpretation there no acute osseous abnormalities with the patient's x-ray imaging including fracture dislocation.  - Contusion/Sprain/Strain: considered with pain with ROM   - Compartment Syndrome: unlikely with 2+ distal pulses, no pallor, no paresthesias, no woody induration.   Findings consistent with plant fasciitis bilaterally.  Patient was wearing the shoes he has been wearing to work which are flat soled converse.  I advised the patient on exam he does have arched feet and recommended arch support and to wear supportive shoes at work.  Educated patient on tennis ball stretches and we will provide with discharge.  Discussed rice therapy.  NSAIDs for pain.  Patient's blood pressure is elevated today reports he has not taken his blood pressure medication instructed to take when he gets home.  At this time I'll discharge home to follow up with primary care physician in the next 1-2 days for further evaluation.  If the pain continues the pt will need to see podiatry for further evaluation.  The patient is comfortable with this plan and comfortable going home at this time. After taking into careful account the historical factors and physical exam findings of the patient's presentation today, in conjunction with the empirical and objective data obtained on ED workup, no acute emergent medical condition has been identified. The patient appears to be low risk for an emergent medical condition and I feel it is safe and appropriate at this time for the patient to be discharged to follow-up as detailed in their discharge instructions for reevaluation and possible continued outpatient workup and management. I have discussed the specifics of the workup with the patient and the patient has verbalized understanding of the  details of the workup, the diagnosis, the treatment plan, and the need for outpatient follow-up.  Although the patient has no emergent etiology today this does not preclude the development of an emergent condition so in addition, I have advised the patient that they can return to the ED and/or activate EMS at any time with worsening of their symptoms, change of their symptoms, or with any other medical complaint.  The patient remained comfortable and stable during their visit in the ED.  Discharge and follow-up instructions discussed with the patient who expressed understanding and willingness to comply with my recommendations. I discussed with the patient/family the diagnosis, treatment plan, indications for return to the emergency department, and for expected follow-up. Please follow up with your primary doctor in 1-2 days and return to the ED in any new, worsening, or continued symptoms. The patient/family verbalized an understanding. The patient/family is asked if there are any questions or concerns. We discuss the case, until all issues are addressed to the patient/family's satisfaction. Patient/family understands and is agreeable to the plan.    SURYA JOHNSON PA-C    DISCLAIMER: [email or web address], a voice recognition system was utilized in creating the note.      Amount and/or Complexity of Data Reviewed  Radiology: ordered and independent interpretation performed. Decision-making details documented in ED Course.    Risk  Prescription drug management.               ED Course as of 06/28/25 1327   Sat Jun 28, 2025   1325 X-Ray Foot Complete Bilateral  No displaced fracture or subluxation in either foot.  No cortical destruction or bone lysis.     No prominent degenerative change.     Unremarkable soft tissues.  No soft tissue gas.  No unexpected radiopaque foreign body.   [AF]      ED Course User Index  [AF] Surya Johnson PA-C                           Clinical Impression:  Final diagnoses:  [M79.673] Foot  pain  [M79.672] Left foot pain  [M72.2] Plantar fasciitis (Primary)  [M79.671] Foot pain, right          ED Disposition Condition    Discharge Stable          ED Prescriptions       Medication Sig Dispense Start Date End Date Auth. Provider    ibuprofen (ADVIL,MOTRIN) 600 MG tablet Take 1 tablet (600 mg total) by mouth every 6 (six) hours as needed for Pain. 20 tablet 6/28/2025 -- Surya Sellers PA-C          Follow-up Information       Follow up With Specialties Details Why Contact Info    Your primary care physician  Schedule an appointment as soon as possible for a visit in 1 day for follow up regarding today's visit and for re-evaluation. Please follow up in 1-2 days.     [facility] -  Schedule an appointment as soon as possible for a visit in 1 day for follow up if you do not currently have a  [address] [place] [address]  [phone]      [facility] Emergency Medicine Go to  If you have new or worsening symptoms, or if you have any concerns at all. [address] [facility] [place] [address] [phone]    Podiatry, [facility] Schedule an appointment as soon as possible for a visit in 2 days for follow up [address] [facility] [address]  [phone]                     [1]   Social History  Tobacco Use    Smoking status: Some Days     Types: Cigarettes    Smokeless tobacco: Never   Substance Use Topics    Alcohol use: Yes     Comment: DAILY LIQUOR/GIN USE    Drug use: No        Surya Sellers PA-C  06/28/25 0156

## 2025-09-01 ENCOUNTER — HOSPITAL ENCOUNTER (EMERGENCY)
Facility: HOSPITAL | Age: 60
Discharge: HOME | End: 2025-09-01
Attending: EMERGENCY MEDICINE

## 2025-09-01 VITALS
SYSTOLIC BLOOD PRESSURE: 167 MMHG | DIASTOLIC BLOOD PRESSURE: 99 MMHG | BODY MASS INDEX: 25.1 KG/M2 | RESPIRATION RATE: 18 BRPM | WEIGHT: 170 LBS | OXYGEN SATURATION: 99 % | TEMPERATURE: 98 F | HEART RATE: 93 BPM

## 2025-09-01 DIAGNOSIS — S20.211A CHEST WALL CONTUSION, RIGHT, INITIAL ENCOUNTER: Primary | ICD-10-CM

## 2025-09-01 DIAGNOSIS — R07.9 CHEST PAIN: ICD-10-CM

## 2025-09-01 LAB
ABSOLUTE EOSINOPHIL (OHS): 0.15 K/UL
ABSOLUTE MONOCYTE (OHS): 0.65 K/UL (ref 0.3–1)
ABSOLUTE NEUTROPHIL COUNT (OHS): 2.79 K/UL (ref 1.8–7.7)
ALBUMIN SERPL BCP-MCNC: 3.8 G/DL (ref 3.5–5.2)
ALP SERPL-CCNC: 67 UNIT/L (ref 40–150)
ALT SERPL W/O P-5'-P-CCNC: 49 UNIT/L (ref 10–44)
ANION GAP (OHS): 16 MMOL/L (ref 8–16)
AST SERPL-CCNC: 232 UNIT/L (ref 11–45)
BASOPHILS # BLD AUTO: 0.06 K/UL
BASOPHILS NFR BLD AUTO: 1.2 %
BILIRUB SERPL-MCNC: 0.8 MG/DL (ref 0.1–1)
BUN SERPL-MCNC: 12 MG/DL (ref 8–23)
CALCIUM SERPL-MCNC: 9.1 MG/DL (ref 8.7–10.5)
CHLORIDE SERPL-SCNC: 104 MMOL/L (ref 95–110)
CO2 SERPL-SCNC: 24 MMOL/L (ref 23–29)
CREAT SERPL-MCNC: 0.9 MG/DL (ref 0.5–1.4)
ERYTHROCYTE [DISTWIDTH] IN BLOOD BY AUTOMATED COUNT: 15.5 % (ref 11.5–14.5)
GFR SERPLBLD CREATININE-BSD FMLA CKD-EPI: >60 ML/MIN/1.73/M2
GLUCOSE SERPL-MCNC: 78 MG/DL (ref 70–110)
HCT VFR BLD AUTO: 38.8 % (ref 40–54)
HGB BLD-MCNC: 12.8 GM/DL (ref 14–18)
IMM GRANULOCYTES # BLD AUTO: 0.01 K/UL (ref 0–0.04)
IMM GRANULOCYTES NFR BLD AUTO: 0.2 % (ref 0–0.5)
LYMPHOCYTES # BLD AUTO: 1.27 K/UL (ref 1–4.8)
MCH RBC QN AUTO: 34.8 PG (ref 27–31)
MCHC RBC AUTO-ENTMCNC: 33 G/DL (ref 32–36)
MCV RBC AUTO: 105 FL (ref 82–98)
NT-PROBNP SERPL-MCNC: 135 PG/ML
NUCLEATED RBC (/100WBC) (OHS): 0 /100 WBC
PLATELET # BLD AUTO: 148 K/UL (ref 150–450)
PMV BLD AUTO: 9.3 FL (ref 9.2–12.9)
POTASSIUM SERPL-SCNC: 4.2 MMOL/L (ref 3.5–5.1)
PROT SERPL-MCNC: 7.1 GM/DL (ref 6–8.4)
RBC # BLD AUTO: 3.68 M/UL (ref 4.6–6.2)
RELATIVE EOSINOPHIL (OHS): 3 %
RELATIVE LYMPHOCYTE (OHS): 25.8 % (ref 18–48)
RELATIVE MONOCYTE (OHS): 13.2 % (ref 4–15)
RELATIVE NEUTROPHIL (OHS): 56.6 % (ref 38–73)
SODIUM SERPL-SCNC: 144 MMOL/L (ref 136–145)
TROPONIN I SERPL HS-MCNC: 6 NG/L
TROPONIN I SERPL HS-MCNC: 8 NG/L
WBC # BLD AUTO: 4.93 K/UL (ref 3.9–12.7)

## 2025-09-01 PROCEDURE — 93010 ELECTROCARDIOGRAM REPORT: CPT | Mod: ,,, | Performed by: INTERNAL MEDICINE

## 2025-09-01 PROCEDURE — 99285 EMERGENCY DEPT VISIT HI MDM: CPT | Mod: 25

## 2025-09-01 PROCEDURE — 85025 COMPLETE CBC W/AUTO DIFF WBC: CPT

## 2025-09-01 PROCEDURE — 80053 COMPREHEN METABOLIC PANEL: CPT

## 2025-09-01 PROCEDURE — 93005 ELECTROCARDIOGRAM TRACING: CPT

## 2025-09-01 PROCEDURE — 63600175 PHARM REV CODE 636 W HCPCS: Mod: JZ,TB

## 2025-09-01 PROCEDURE — 83880 ASSAY OF NATRIURETIC PEPTIDE: CPT

## 2025-09-01 PROCEDURE — 84484 ASSAY OF TROPONIN QUANT: CPT

## 2025-09-01 PROCEDURE — 96374 THER/PROPH/DIAG INJ IV PUSH: CPT

## 2025-09-01 PROCEDURE — 25000003 PHARM REV CODE 250

## 2025-09-01 RX ORDER — LIDOCAINE 50 MG/G
1 PATCH TOPICAL DAILY
Qty: 5 PATCH | Refills: 0 | Status: SHIPPED | OUTPATIENT
Start: 2025-09-01 | End: 2025-09-06

## 2025-09-01 RX ORDER — LIDOCAINE 50 MG/G
1 PATCH TOPICAL
Status: DISCONTINUED | OUTPATIENT
Start: 2025-09-01 | End: 2025-09-01 | Stop reason: HOSPADM

## 2025-09-01 RX ORDER — KETOROLAC TROMETHAMINE 30 MG/ML
15 INJECTION, SOLUTION INTRAMUSCULAR; INTRAVENOUS
Status: COMPLETED | OUTPATIENT
Start: 2025-09-01 | End: 2025-09-01

## 2025-09-01 RX ORDER — MELOXICAM 15 MG/1
15 TABLET ORAL DAILY
Qty: 5 TABLET | Refills: 0 | Status: SHIPPED | OUTPATIENT
Start: 2025-09-01 | End: 2025-09-06

## 2025-09-01 RX ADMIN — LIDOCAINE 1 PATCH: 50 PATCH TOPICAL at 09:09

## 2025-09-01 RX ADMIN — KETOROLAC TROMETHAMINE 15 MG: 30 INJECTION, SOLUTION INTRAMUSCULAR; INTRAVENOUS at 08:09

## 2025-09-03 LAB
OHS QRS DURATION: 72 MS
OHS QTC CALCULATION: 475 MS